# Patient Record
Sex: MALE | Race: WHITE | NOT HISPANIC OR LATINO
[De-identification: names, ages, dates, MRNs, and addresses within clinical notes are randomized per-mention and may not be internally consistent; named-entity substitution may affect disease eponyms.]

---

## 2017-05-16 ENCOUNTER — APPOINTMENT (OUTPATIENT)
Dept: NEUROLOGY | Facility: HOSPITAL | Age: 57
End: 2017-05-16

## 2017-07-18 ENCOUNTER — APPOINTMENT (OUTPATIENT)
Dept: NEUROLOGY | Facility: HOSPITAL | Age: 57
End: 2017-07-18

## 2017-07-18 ENCOUNTER — OUTPATIENT (OUTPATIENT)
Dept: OUTPATIENT SERVICES | Facility: HOSPITAL | Age: 57
LOS: 1 days | End: 2017-07-18

## 2017-07-18 VITALS
HEART RATE: 88 BPM | HEIGHT: 65 IN | SYSTOLIC BLOOD PRESSURE: 133 MMHG | BODY MASS INDEX: 26.34 KG/M2 | WEIGHT: 158.07 LBS | DIASTOLIC BLOOD PRESSURE: 88 MMHG

## 2017-07-18 DIAGNOSIS — Z86.59 PERSONAL HISTORY OF OTHER MENTAL AND BEHAVIORAL DISORDERS: ICD-10-CM

## 2017-07-18 DIAGNOSIS — Z00.00 ENCOUNTER FOR GENERAL ADULT MEDICAL EXAMINATION W/OUT ABNORMAL FINDINGS: ICD-10-CM

## 2017-07-18 DIAGNOSIS — Z86.69 PERSONAL HISTORY OF OTHER DISEASES OF THE NERVOUS SYSTEM AND SENSE ORGANS: ICD-10-CM

## 2017-07-18 DIAGNOSIS — Z81.8 FAMILY HISTORY OF OTHER MENTAL AND BEHAVIORAL DISORDERS: ICD-10-CM

## 2017-08-01 DIAGNOSIS — G80.9 CEREBRAL PALSY, UNSPECIFIED: ICD-10-CM

## 2017-10-24 ENCOUNTER — OUTPATIENT (OUTPATIENT)
Dept: OUTPATIENT SERVICES | Facility: HOSPITAL | Age: 57
LOS: 1 days | End: 2017-10-24

## 2017-10-24 ENCOUNTER — APPOINTMENT (OUTPATIENT)
Dept: NEUROLOGY | Facility: HOSPITAL | Age: 57
End: 2017-10-24

## 2017-10-24 VITALS
WEIGHT: 157 LBS | HEIGHT: 65 IN | SYSTOLIC BLOOD PRESSURE: 127 MMHG | BODY MASS INDEX: 26.16 KG/M2 | DIASTOLIC BLOOD PRESSURE: 84 MMHG | HEART RATE: 81 BPM

## 2017-10-24 DIAGNOSIS — R56.9 UNSPECIFIED CONVULSIONS: ICD-10-CM

## 2017-10-24 DIAGNOSIS — G80.9 CEREBRAL PALSY, UNSPECIFIED: ICD-10-CM

## 2017-10-24 RX ORDER — DIVALPROEX SODIUM 500 MG/1
500 TABLET, DELAYED RELEASE ORAL
Qty: 60 | Refills: 2 | Status: ACTIVE | COMMUNITY
Start: 2017-10-24 | End: 1900-01-01

## 2017-11-13 ENCOUNTER — APPOINTMENT (OUTPATIENT)
Dept: CT IMAGING | Facility: IMAGING CENTER | Age: 57
End: 2017-11-13

## 2017-11-22 ENCOUNTER — APPOINTMENT (OUTPATIENT)
Dept: CT IMAGING | Facility: IMAGING CENTER | Age: 57
End: 2017-11-22
Payer: MEDICARE

## 2017-11-22 ENCOUNTER — OUTPATIENT (OUTPATIENT)
Dept: OUTPATIENT SERVICES | Facility: HOSPITAL | Age: 57
LOS: 1 days | End: 2017-11-22
Payer: MEDICARE

## 2017-11-22 DIAGNOSIS — Z00.00 ENCOUNTER FOR GENERAL ADULT MEDICAL EXAMINATION WITHOUT ABNORMAL FINDINGS: ICD-10-CM

## 2017-11-22 PROCEDURE — 70450 CT HEAD/BRAIN W/O DYE: CPT

## 2017-11-22 PROCEDURE — 70450 CT HEAD/BRAIN W/O DYE: CPT | Mod: 26

## 2017-12-19 ENCOUNTER — OUTPATIENT (OUTPATIENT)
Dept: OUTPATIENT SERVICES | Facility: HOSPITAL | Age: 57
LOS: 1 days | End: 2017-12-19

## 2017-12-19 ENCOUNTER — APPOINTMENT (OUTPATIENT)
Dept: NEUROLOGY | Facility: HOSPITAL | Age: 57
End: 2017-12-19

## 2017-12-19 VITALS
DIASTOLIC BLOOD PRESSURE: 75 MMHG | BODY MASS INDEX: 26.16 KG/M2 | HEIGHT: 65 IN | HEART RATE: 82 BPM | SYSTOLIC BLOOD PRESSURE: 125 MMHG | WEIGHT: 157 LBS

## 2017-12-19 DIAGNOSIS — G80.9 CEREBRAL PALSY, UNSPECIFIED: ICD-10-CM

## 2018-06-14 ENCOUNTER — LABORATORY RESULT (OUTPATIENT)
Age: 58
End: 2018-06-14

## 2018-06-14 ENCOUNTER — APPOINTMENT (OUTPATIENT)
Dept: GASTROENTEROLOGY | Facility: HOSPITAL | Age: 58
End: 2018-06-14

## 2018-06-14 ENCOUNTER — OUTPATIENT (OUTPATIENT)
Dept: OUTPATIENT SERVICES | Facility: HOSPITAL | Age: 58
LOS: 1 days | End: 2018-06-14

## 2018-06-14 VITALS
SYSTOLIC BLOOD PRESSURE: 110 MMHG | HEIGHT: 65 IN | HEART RATE: 96 BPM | DIASTOLIC BLOOD PRESSURE: 74 MMHG | WEIGHT: 152 LBS | BODY MASS INDEX: 25.33 KG/M2

## 2018-06-14 DIAGNOSIS — Z12.11 ENCOUNTER FOR SCREENING FOR MALIGNANT NEOPLASM OF COLON: ICD-10-CM

## 2018-06-14 DIAGNOSIS — K57.90 DIVERTICULOSIS OF INTESTINE, PART UNSPECIFIED, WITHOUT PERFORATION OR ABSCESS WITHOUT BLEEDING: ICD-10-CM

## 2018-06-14 DIAGNOSIS — Z78.9 OTHER SPECIFIED HEALTH STATUS: ICD-10-CM

## 2018-06-14 LAB
ALBUMIN SERPL ELPH-MCNC: 4.2 G/DL — SIGNIFICANT CHANGE UP (ref 3.3–5)
ALP SERPL-CCNC: 70 U/L — SIGNIFICANT CHANGE UP (ref 40–120)
ALT FLD-CCNC: 13 U/L — SIGNIFICANT CHANGE UP (ref 4–41)
AST SERPL-CCNC: 18 U/L — SIGNIFICANT CHANGE UP (ref 4–40)
BASOPHILS # BLD AUTO: 0.02 K/UL — SIGNIFICANT CHANGE UP (ref 0–0.2)
BASOPHILS NFR BLD AUTO: 0.4 % — SIGNIFICANT CHANGE UP (ref 0–2)
BILIRUB SERPL-MCNC: 0.4 MG/DL — SIGNIFICANT CHANGE UP (ref 0.2–1.2)
BUN SERPL-MCNC: 12 MG/DL — SIGNIFICANT CHANGE UP (ref 7–23)
CALCIUM SERPL-MCNC: 9.3 MG/DL — SIGNIFICANT CHANGE UP (ref 8.4–10.5)
CHLORIDE SERPL-SCNC: 101 MMOL/L — SIGNIFICANT CHANGE UP (ref 98–107)
CO2 SERPL-SCNC: 30 MMOL/L — SIGNIFICANT CHANGE UP (ref 22–31)
CREAT SERPL-MCNC: 0.77 MG/DL — SIGNIFICANT CHANGE UP (ref 0.5–1.3)
EOSINOPHIL # BLD AUTO: 0.02 K/UL — SIGNIFICANT CHANGE UP (ref 0–0.5)
EOSINOPHIL NFR BLD AUTO: 0.4 % — SIGNIFICANT CHANGE UP (ref 0–6)
GLUCOSE SERPL-MCNC: 71 MG/DL — SIGNIFICANT CHANGE UP (ref 70–99)
HCT VFR BLD CALC: 43.3 % — SIGNIFICANT CHANGE UP (ref 39–50)
HGB BLD-MCNC: 15.1 G/DL — SIGNIFICANT CHANGE UP (ref 13–17)
IMM GRANULOCYTES # BLD AUTO: 0.01 # — SIGNIFICANT CHANGE UP
IMM GRANULOCYTES NFR BLD AUTO: 0.2 % — SIGNIFICANT CHANGE UP (ref 0–1.5)
LYMPHOCYTES # BLD AUTO: 2.37 K/UL — SIGNIFICANT CHANGE UP (ref 1–3.3)
LYMPHOCYTES # BLD AUTO: 47.2 % — HIGH (ref 13–44)
MCHC RBC-ENTMCNC: 31.9 PG — SIGNIFICANT CHANGE UP (ref 27–34)
MCHC RBC-ENTMCNC: 34.9 % — SIGNIFICANT CHANGE UP (ref 32–36)
MCV RBC AUTO: 91.5 FL — SIGNIFICANT CHANGE UP (ref 80–100)
MONOCYTES # BLD AUTO: 0.46 K/UL — SIGNIFICANT CHANGE UP (ref 0–0.9)
MONOCYTES NFR BLD AUTO: 9.2 % — SIGNIFICANT CHANGE UP (ref 2–14)
NEUTROPHILS # BLD AUTO: 2.14 K/UL — SIGNIFICANT CHANGE UP (ref 1.8–7.4)
NEUTROPHILS NFR BLD AUTO: 42.6 % — LOW (ref 43–77)
NRBC # FLD: 0 — SIGNIFICANT CHANGE UP
PLATELET # BLD AUTO: 158 K/UL — SIGNIFICANT CHANGE UP (ref 150–400)
PMV BLD: 10 FL — SIGNIFICANT CHANGE UP (ref 7–13)
POTASSIUM SERPL-MCNC: 4.1 MMOL/L — SIGNIFICANT CHANGE UP (ref 3.5–5.3)
POTASSIUM SERPL-SCNC: 4.1 MMOL/L — SIGNIFICANT CHANGE UP (ref 3.5–5.3)
PROT SERPL-MCNC: 7.3 G/DL — SIGNIFICANT CHANGE UP (ref 6–8.3)
RBC # BLD: 4.73 M/UL — SIGNIFICANT CHANGE UP (ref 4.2–5.8)
RBC # FLD: 12.4 % — SIGNIFICANT CHANGE UP (ref 10.3–14.5)
SODIUM SERPL-SCNC: 142 MMOL/L — SIGNIFICANT CHANGE UP (ref 135–145)
WBC # BLD: 5.02 K/UL — SIGNIFICANT CHANGE UP (ref 3.8–10.5)
WBC # FLD AUTO: 5.02 K/UL — SIGNIFICANT CHANGE UP (ref 3.8–10.5)

## 2018-06-14 RX ORDER — CHROMIUM 200 MCG
1000 TABLET ORAL DAILY
Qty: 30 | Refills: 0 | Status: ACTIVE | COMMUNITY
Start: 2018-06-14

## 2018-06-14 RX ORDER — DOCUSATE SODIUM 100 MG/1
100 CAPSULE ORAL 3 TIMES DAILY
Qty: 90 | Refills: 5 | Status: ACTIVE | COMMUNITY
Start: 2018-06-14

## 2018-07-19 ENCOUNTER — TRANSCRIPTION ENCOUNTER (OUTPATIENT)
Age: 58
End: 2018-07-19

## 2018-08-09 ENCOUNTER — OTHER (OUTPATIENT)
Age: 58
End: 2018-08-09

## 2018-10-18 ENCOUNTER — APPOINTMENT (OUTPATIENT)
Dept: GASTROENTEROLOGY | Facility: HOSPITAL | Age: 58
End: 2018-10-18
Payer: MEDICARE

## 2018-10-18 ENCOUNTER — OUTPATIENT (OUTPATIENT)
Dept: OUTPATIENT SERVICES | Facility: HOSPITAL | Age: 58
LOS: 1 days | End: 2018-10-18

## 2018-10-18 VITALS
SYSTOLIC BLOOD PRESSURE: 117 MMHG | BODY MASS INDEX: 19.25 KG/M2 | HEIGHT: 74 IN | DIASTOLIC BLOOD PRESSURE: 83 MMHG | WEIGHT: 150 LBS | HEART RATE: 78 BPM

## 2018-10-18 PROCEDURE — 99213 OFFICE O/P EST LOW 20 MIN: CPT | Mod: GC

## 2018-10-19 DIAGNOSIS — Z12.11 ENCOUNTER FOR SCREENING FOR MALIGNANT NEOPLASM OF COLON: ICD-10-CM

## 2018-10-19 DIAGNOSIS — K57.90 DIVERTICULOSIS OF INTESTINE, PART UNSPECIFIED, WITHOUT PERFORATION OR ABSCESS WITHOUT BLEEDING: ICD-10-CM

## 2019-01-24 ENCOUNTER — APPOINTMENT (OUTPATIENT)
Dept: GASTROENTEROLOGY | Facility: HOSPITAL | Age: 59
End: 2019-01-24

## 2019-01-31 ENCOUNTER — APPOINTMENT (OUTPATIENT)
Dept: GASTROENTEROLOGY | Facility: HOSPITAL | Age: 59
End: 2019-01-31

## 2019-02-28 ENCOUNTER — OUTPATIENT (OUTPATIENT)
Dept: OUTPATIENT SERVICES | Facility: HOSPITAL | Age: 59
LOS: 1 days | End: 2019-02-28

## 2019-02-28 ENCOUNTER — APPOINTMENT (OUTPATIENT)
Dept: GASTROENTEROLOGY | Facility: HOSPITAL | Age: 59
End: 2019-02-28
Payer: MEDICARE

## 2019-02-28 VITALS
DIASTOLIC BLOOD PRESSURE: 90 MMHG | SYSTOLIC BLOOD PRESSURE: 135 MMHG | BODY MASS INDEX: 19.18 KG/M2 | WEIGHT: 149.44 LBS | HEART RATE: 104 BPM | HEIGHT: 74 IN

## 2019-02-28 PROCEDURE — 99213 OFFICE O/P EST LOW 20 MIN: CPT | Mod: GC

## 2019-02-28 RX ORDER — POLYETHYLENE GLYCOL 3350, SODIUM SULFATE ANHYDROUS, SODIUM BICARBONATE, SODIUM CHLORIDE, POTASSIUM CHLORIDE 227.1; 21.5; 6.36; 5.53; .754 G/L; G/L; G/L; G/L; G/L
227.1 POWDER, FOR SOLUTION ORAL
Qty: 1 | Refills: 0 | Status: DISCONTINUED | COMMUNITY
Start: 2018-06-14 | End: 2019-02-28

## 2019-02-28 NOTE — HISTORY OF PRESENT ILLNESS
[de-identified] : This is a 58 year old man with cerebral palsy and seizure disorder who presents to GI clinic for follow up regarding colorectal cancer screening.\par \par He was last seen in GI clinic on 10/18/18. At that time he was ordered for Cologuard test.  Although the caregiver stated that the Cologuard test was sent out we called [facility] and they could not find any request or results.  The description by the aide of the test performed was likely a FIT or FOBT test of which we do not have the result.\par \par In the past, he had a colonoscopy (2011) with no obvious polyps but poor prep. He was noted to have internal hemorrhoids and diverticulosis. He was recommended to have repeat colonoscopy in 2016, but did not get it. \par \par Today, patient is accompanied by a caretaker. He reports feeling very well. He denies abdominal pain, nausea, vomiting, melena, hematochezia, hematemesis, weight loss. He moves his bowels regularly. He has no additional complaints.

## 2019-02-28 NOTE — ASSESSMENT
[FreeTextEntry1] : Impression:\par 1. Colorectal cancer screening, at average risk \par 2. History of diverticulosis on previous colonoscopy\par 3. Mental retardation\par \par Recommend:\par - Ordered Cologuard, top half of the form was filled out with Dr. Morel and then it was given to direct patient support personnel who accompanied the patient; explained that the form will need to be filled out and faxed directly to [facility]\par - High fiber diet given history of diverticulosis\par - Recommend to return to clinic in 2 months to discuss results; recommended to call clinic prior to appointment to see if results are available

## 2019-02-28 NOTE — PHYSICAL EXAM
[General Appearance - Alert] : alert [General Appearance - In No Acute Distress] : in no acute distress [Sclera] : the sclera and conjunctiva were normal [Outer Ear] : the ears and nose were normal in appearance [Hearing Threshold Finger Rub Not [place]] : hearing was normal [Neck Appearance] : the appearance of the neck was normal [] : no respiratory distress [Respiration, Rhythm And Depth] : normal respiratory rhythm and effort [Heart Rate And Rhythm] : heart rate was normal and rhythm regular [Arterial Pulses Carotid] : carotid pulses were normal with no bruits [Abdominal Aorta] : the abdominal aorta was normal [Bowel Sounds] : normal bowel sounds [Abdomen Soft] : soft [Abdomen Tenderness] : non-tender [Cervical Lymph Nodes Enlarged Posterior Bilaterally] : posterior cervical [Cervical Lymph Nodes Enlarged Anterior Bilaterally] : anterior cervical [No CVA Tenderness] : no ~M costovertebral angle tenderness [Involuntary Movements] : no involuntary movements were seen [Skin Color & Pigmentation] : normal skin color and pigmentation [Affect] : the affect was normal [Mood] : the mood was normal

## 2019-02-28 NOTE — REASON FOR VISIT
[Follow-Up: _____] : a [unfilled] follow-up visit [Formal Caregiver] : formal caregiver [FreeTextEntry1] : colorectal cancer screening

## 2019-03-01 DIAGNOSIS — Z12.11 ENCOUNTER FOR SCREENING FOR MALIGNANT NEOPLASM OF COLON: ICD-10-CM

## 2019-03-01 DIAGNOSIS — K57.90 DIVERTICULOSIS OF INTESTINE, PART UNSPECIFIED, WITHOUT PERFORATION OR ABSCESS WITHOUT BLEEDING: ICD-10-CM

## 2019-06-29 ENCOUNTER — TRANSCRIPTION ENCOUNTER (OUTPATIENT)
Age: 59
End: 2019-06-29

## 2019-08-29 ENCOUNTER — APPOINTMENT (OUTPATIENT)
Dept: GASTROENTEROLOGY | Facility: CLINIC | Age: 59
End: 2019-08-29
Payer: MEDICARE

## 2019-08-29 ENCOUNTER — OUTPATIENT (OUTPATIENT)
Dept: OUTPATIENT SERVICES | Facility: HOSPITAL | Age: 59
LOS: 1 days | End: 2019-08-29

## 2019-08-29 ENCOUNTER — OTHER (OUTPATIENT)
Age: 59
End: 2019-08-29

## 2019-08-29 VITALS
SYSTOLIC BLOOD PRESSURE: 140 MMHG | WEIGHT: 152 LBS | HEIGHT: 62 IN | OXYGEN SATURATION: 98 % | HEART RATE: 83 BPM | DIASTOLIC BLOOD PRESSURE: 80 MMHG | BODY MASS INDEX: 27.97 KG/M2

## 2019-08-29 DIAGNOSIS — K57.90 DIVERTICULOSIS OF INTESTINE, PART UNSPECIFIED, W/OUT PERFORATION OR ABSCESS W/OUT BLEEDING: ICD-10-CM

## 2019-08-29 DIAGNOSIS — K57.90 DIVERTICULOSIS OF INTESTINE, PART UNSPECIFIED, WITHOUT PERFORATION OR ABSCESS WITHOUT BLEEDING: ICD-10-CM

## 2019-08-29 DIAGNOSIS — K92.1 MELENA: ICD-10-CM

## 2019-08-29 PROCEDURE — 99213 OFFICE O/P EST LOW 20 MIN: CPT | Mod: GC

## 2019-08-29 NOTE — END OF VISIT
[] : Fellow [FreeTextEntry3] : As modified and discussed with patient\par MD INOCENCIO Gregg [facility]\par Associate Professor of Medicine\par [facility] [employer]\par

## 2019-08-29 NOTE — PHYSICAL EXAM
[General Appearance - Well Nourished] : well nourished [General Appearance - Well Developed] : well developed [Sclera] : the sclera and conjunctiva were normal [Extraocular Movements] : extraocular movements were intact [Outer Ear] : the ears and nose were normal in appearance [Hearing Threshold Finger Rub Not [place]] : hearing was normal [Neck Appearance] : the appearance of the neck was normal [Neck Cervical Mass (___cm)] : no neck mass was observed [Respiration, Rhythm And Depth] : normal respiratory rhythm and effort [Auscultation Breath Sounds / Voice Sounds] : lungs were clear to auscultation bilaterally [Heart Sounds] : normal S1 and S2 [Murmurs] : no murmurs [Heart Sounds Pericardial Friction Rub] : no pericardial rub [Bowel Sounds] : normal bowel sounds [Abdomen Soft] : soft [Abdomen Tenderness] : non-tender [Cervical Lymph Nodes Enlarged Posterior Bilaterally] : posterior cervical [Cervical Lymph Nodes Enlarged Anterior Bilaterally] : anterior cervical [Nail Clubbing] : no clubbing  or cyanosis of the fingernails [Motor Tone] : muscle strength and tone were normal [] : no rash [Skin Lesions] : no skin lesions [Sensation] : the sensory exam was normal to light touch and pinprick [Motor Exam] : the motor exam was normal [Affect] : the affect was normal [Mood] : the mood was normal

## 2019-08-29 NOTE — HISTORY OF PRESENT ILLNESS
[de-identified] : Mr. NEIDA ALCARAZ 59 year man, is seen for evaluation of hematochezia. Pt was last seen in the clinic on 02/2019.\par \par Pt reportedly had 1 episode of hematochezia back in 07/2019. This occurred only once. Pt presented to the ED and was found to have a hgb of 15.4. The patient denies nausea, vomiting, a change in bowel habit, dysphagia, weight loss, jaundice, melena or abdominal pain. He has daily bowel movements.  A COLOGUARD test was negative this year.\par \par There is no known family history of colon cancer, colon polyp, peptic ulcer disease, female genitourinary disease, liver disease, cholelithiasis, pancreatic disease or cancer, inflammatory bowel disease or celiac disease.\par \par Pt's last colonoscopy was in 2011 with no obvious polyps but poor prep. Patient had a negative cologuard in 03/2019.

## 2019-08-29 NOTE — ASSESSMENT
[FreeTextEntry1] : Impression:\par 1) Hematochezia- \par Differential diagnosis includes colorectal cancer, colonic polyp, diverticular bleeding, angiectasia, anal fissure/hemorrhoids  \par \par \par Recommendation:\par -Will plan for colonoscopy and prep with magnesium citrtate\par Risks and benefits of the procedure, including: perforation (tear in the colon), bleeding, reaction to sedation, and possible risk for surgical resection and colostomy bag explained\par \par D/w Dr Morel. \par \par

## 2019-09-19 ENCOUNTER — MESSAGE (OUTPATIENT)
Age: 59
End: 2019-09-19

## 2019-11-15 ENCOUNTER — CHART COPY (OUTPATIENT)
Age: 59
End: 2019-11-15

## 2019-11-20 ENCOUNTER — OUTPATIENT (OUTPATIENT)
Dept: OUTPATIENT SERVICES | Facility: HOSPITAL | Age: 59
LOS: 1 days | Discharge: HOME | End: 2019-11-20
Payer: MEDICARE

## 2019-11-20 ENCOUNTER — RESULT REVIEW (OUTPATIENT)
Age: 59
End: 2019-11-20

## 2019-11-20 VITALS
WEIGHT: 151.9 LBS | DIASTOLIC BLOOD PRESSURE: 87 MMHG | RESPIRATION RATE: 12 BRPM | OXYGEN SATURATION: 98 % | TEMPERATURE: 98 F | HEART RATE: 85 BPM | HEIGHT: 62 IN | SYSTOLIC BLOOD PRESSURE: 138 MMHG

## 2019-11-20 VITALS
HEART RATE: 84 BPM | RESPIRATION RATE: 11 BRPM | OXYGEN SATURATION: 96 % | DIASTOLIC BLOOD PRESSURE: 91 MMHG | SYSTOLIC BLOOD PRESSURE: 126 MMHG

## 2019-11-20 DIAGNOSIS — K92.1 MELENA: ICD-10-CM

## 2019-11-20 PROCEDURE — 45380 COLONOSCOPY AND BIOPSY: CPT | Mod: GC

## 2019-11-20 PROCEDURE — 88305 TISSUE EXAM BY PATHOLOGIST: CPT | Mod: 26

## 2019-11-20 RX ORDER — SODIUM CHLORIDE 9 MG/ML
1000 INJECTION, SOLUTION INTRAVENOUS
Refills: 0 | Status: DISCONTINUED | OUTPATIENT
Start: 2019-11-20 | End: 2019-12-17

## 2019-11-20 NOTE — ASU PATIENT PROFILE, ADULT - NS PRO PASSIVE SMOKE EXP
4/3/2018       [address]        Dear Alana Olmos,    Dr Emma Puente has retired. You need to establish care with a ne primary care doctor. You can call our office to schedule with one of the physicians taking new patients, or you can establish at the clinic of your choice. You are due for a follow up so please call soon. So your new doctor can manage your refills.     Sincerely,          [place] [address] [address]  [phone] Unknown

## 2019-11-21 ENCOUNTER — EMERGENCY (EMERGENCY)
Facility: HOSPITAL | Age: 59
LOS: 1 days | Discharge: HOME | End: 2019-11-21
Admitting: EMERGENCY MEDICINE
Payer: COMMERCIAL

## 2019-11-21 VITALS
TEMPERATURE: 98 F | RESPIRATION RATE: 18 BRPM | SYSTOLIC BLOOD PRESSURE: 129 MMHG | HEART RATE: 91 BPM | DIASTOLIC BLOOD PRESSURE: 72 MMHG

## 2019-11-21 PROCEDURE — 99282 EMERGENCY DEPT VISIT SF MDM: CPT
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
## 2019-11-21 NOTE — ED PROVIDER NOTE - NS ED MD EM SELECTION
90025 Exp Problem Focused - Low Complex 61829 Exp Problem Focused - Low Complex 37868 Exp Problem Focused - Low Complex 46071 Exp Problem Focused - Low Complex

## 2019-11-21 NOTE — ED PROVIDER NOTE - PATIENT PORTAL LINK FT
You can access the FollowMyHealth Patient Portal offered by [facility] by registering at the following website: [email or web address]. By joining [employer]’s FollowMyHealth portal, you will also be able to view your health information using other applications (apps) compatible with our system. You can access the FollowMyHealth Patient Portal offered by [facility] by registering at the following website: [email or web address]. By joining [employer]’s FollowMyHealth portal, you will also be able to view your health information using other applications (apps) compatible with our system. You can access the FollowMyHealth Patient Portal offered by [facility] by registering at the following website: [email or web address]. By joining [employer]’s FollowMyHealth portal, you will also be able to view your health information using other applications (apps) compatible with our system. You can access the FollowMyHealth Patient Portal offered by [facility] by registering at the following website: [email or web address]. By joining [employer]’s FollowMyHealth portal, you will also be able to view your health information using other applications (apps) compatible with our system.

## 2019-11-21 NOTE — ED PROVIDER NOTE - PHYSICAL EXAMINATION
Vital signs reviewed.   CONSTITUTIONAL: Well-appearing;  in no apparent distress. Non-toxic appearing.   HEAD: Normocephalic, atraumatic.  EYES: PERRL, EOM intact, conjunctiva and sclera WNL.  ENT: normal nose; no rhinorrhea;  NECK/LYMPH: Supple; non-tender  CARD: Normal S1, S2; no murmurs, rubs, or gallops noted.  RESP: Normal chest excursion with respiration; breath sounds clear and equal bilaterally; no wheezes, rhonchi, or rales.  ABD/GI: soft, non-distended; non-tender;  EXT/MS: moves all extremities;  SKIN: Normal for age and race;   NEURO: Awake, alert, oriented x 3, no gross deficits  PSYCH: Normal mood; appropriate affect.  '
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
## 2019-12-14 ENCOUNTER — EMERGENCY (EMERGENCY)
Facility: HOSPITAL | Age: 59
LOS: 1 days | Discharge: HOME | End: 2019-12-14
Attending: EMERGENCY MEDICINE | Admitting: EMERGENCY MEDICINE
Payer: COMMERCIAL

## 2019-12-14 VITALS
SYSTOLIC BLOOD PRESSURE: 117 MMHG | TEMPERATURE: 99 F | DIASTOLIC BLOOD PRESSURE: 77 MMHG | RESPIRATION RATE: 18 BRPM | OXYGEN SATURATION: 99 % | HEART RATE: 69 BPM

## 2019-12-14 PROCEDURE — 99282 EMERGENCY DEPT VISIT SF MDM: CPT

## 2019-12-14 PROCEDURE — 99053 MED SERV 10PM-8AM 24 HR FAC: CPT
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
## 2019-12-15 NOTE — ED PROVIDER NOTE - PATIENT PORTAL LINK FT
You can access the FollowMyHealth Patient Portal offered by [facility] by registering at the following website: [email or web address]. By joining [employer]’s FollowMyHealth portal, you will also be able to view your health information using other applications (apps) compatible with our system. You can access the FollowMyHealth Patient Portal offered by [facility] by registering at the following website: [email or web address]. By joining [employer]’s FollowMyHealth portal, you will also be able to view your health information using other applications (apps) compatible with our system. You can access the FollowMyHealth Patient Portal offered by [facility] by registering at the following website: [email or web address]. By joining [employer]’s FollowMyHealth portal, you will also be able to view your health information using other applications (apps) compatible with our system. You can access the FollowMyHealth Patient Portal offered by [facility] by registering at the following website: [email or web address]. By joining [employer]’s FollowMyHealth portal, you will also be able to view your health information using other applications (apps) compatible with our system.
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
## 2019-12-15 NOTE — ED PROVIDER NOTE - NS ED MD EM SELECTION
31787 Exp Problem Focused - Mod. Complex 71461 Exp Problem Focused - Mod. Complex 82047 Exp Problem Focused - Mod. Complex 70272 Exp Problem Focused - Mod. Complex

## 2020-01-16 ENCOUNTER — OUTPATIENT (OUTPATIENT)
Dept: OUTPATIENT SERVICES | Facility: HOSPITAL | Age: 60
LOS: 1 days | End: 2020-01-16

## 2020-01-16 ENCOUNTER — APPOINTMENT (OUTPATIENT)
Dept: GASTROENTEROLOGY | Facility: CLINIC | Age: 60
End: 2020-01-16
Payer: MEDICARE

## 2020-01-16 VITALS
BODY MASS INDEX: 28.41 KG/M2 | HEIGHT: 62 IN | WEIGHT: 154.38 LBS | HEART RATE: 72 BPM | OXYGEN SATURATION: 99 % | DIASTOLIC BLOOD PRESSURE: 62 MMHG | SYSTOLIC BLOOD PRESSURE: 108 MMHG

## 2020-01-16 DIAGNOSIS — K59.09 OTHER CONSTIPATION: ICD-10-CM

## 2020-01-16 DIAGNOSIS — D12.6 BENIGN NEOPLASM OF COLON, UNSPECIFIED: ICD-10-CM

## 2020-01-16 DIAGNOSIS — K92.1 MELENA: ICD-10-CM

## 2020-01-16 PROCEDURE — 99213 OFFICE O/P EST LOW 20 MIN: CPT | Mod: GE

## 2020-01-16 NOTE — PHYSICAL EXAM
[General Appearance - Alert] : alert [General Appearance - In No Acute Distress] : in no acute distress [Abdomen Soft] : soft [Bowel Sounds] : normal bowel sounds [Abdomen Tenderness] : non-tender [Abdomen Mass (___ Cm)] : no abdominal mass palpated [Abnormal Walk] : normal gait [Nail Clubbing] : no clubbing  or cyanosis of the fingernails [Musculoskeletal - Swelling] : no joint swelling seen [Oriented To Time, Place, And Person] : oriented to person, place, and time [Motor Tone] : muscle strength and tone were normal [Impaired Insight] : insight and judgment were intact [Affect] : the affect was normal [Sclera] : the sclera and conjunctiva were normal [Outer Ear] : the ears and nose were normal in appearance [Neck Appearance] : the appearance of the neck was normal [] : no respiratory distress [Exaggerated Use Of Accessory Muscles For Inspiration] : no accessory muscle use [Heart Rate And Rhythm] : heart rate was normal and rhythm regular [Edema] : there was no peripheral edema [Skin Color & Pigmentation] : normal skin color and pigmentation [No Focal Deficits] : no focal deficits

## 2020-01-16 NOTE — HISTORY OF PRESENT ILLNESS
[de-identified] : Mr. NEIDA ALCARAZ 59 year man, is seen for follow up after colonoscopy for evaluation of hematochezia. Pt was last seen in the clinic on 02/2019.\par \par Pt reportedly had 1 episode of hematochezia back in 07/2019. This occurred only once. Pt presented to the ED and was found to have a hgb of 15.4. The patient denies nausea, vomiting, a change in bowel habit, dysphagia, weight loss, jaundice, melena or abdominal pain. He has daily bowel movements. A COLOGUARD test was negative this year.\par \par There is no known family history of colon cancer, colon polyp, peptic ulcer disease, female genitourinary disease, liver disease, cholelithiasis, pancreatic disease or cancer, inflammatory bowel disease or celiac disease.\par \par Pt's last colonoscopy before the last visit was in 2011 with no obvious polyps but poor prep. Patient had a negative cologuard in 03/2019. \par \par Patient saw Dr. Ness in August 2019 and was referred for colonoscopy which showed  - One 4 mm polyp in the cecum, removed with a cold  biopsy forceps. Resected and retrieved Internal hemorrhoids. The examined portion of the ileum was normal.  Biopsy was a Tubular adenoma, next colonoscopy recommended in 5 years.  \par \par Denies any further hematochezia.  Abdominal pain pain. No weight loss but a slight gain.  \par

## 2020-01-16 NOTE — END OF VISIT
[] : Fellow [FreeTextEntry3] : Here for follow up for hematochezia, now s/p colonoscopy with diminutive TA and IH (latter likely source of bleeding). Recommend repeat colonoscopy in 5 years. High fiber diet and hydration for constipation.

## 2020-01-16 NOTE — ASSESSMENT
[FreeTextEntry1] : Impression:\par 1) Tubular adenoma - found on colonoscopy in 2019.  Due for repeat colonoscopy in 5 years.  \par 2) Hematochezia - with colonoscopy only with 1 TA and internal hemorrhoids.  Differential diagnosis includes angiectasia, anal fissure but most likely hemorrhoids \par \par \par Recommendation:\par - routine hemorrhoidal care\par - high fiber diet\par - repeat colonoscopy in 5 years\par - follow up at clinic as needed

## 2022-08-28 ENCOUNTER — EMERGENCY (EMERGENCY)
Facility: HOSPITAL | Age: 62
LOS: 1 days | Discharge: HOME | End: 2022-08-28
Admitting: EMERGENCY MEDICINE

## 2022-08-28 VITALS
OXYGEN SATURATION: 95 % | HEART RATE: 96 BPM | RESPIRATION RATE: 16 BRPM | DIASTOLIC BLOOD PRESSURE: 91 MMHG | TEMPERATURE: 98 F | SYSTOLIC BLOOD PRESSURE: 156 MMHG

## 2022-08-28 PROCEDURE — 99283 EMERGENCY DEPT VISIT LOW MDM: CPT

## 2022-08-28 RX ORDER — OFLOXACIN 0.3 %
2 DROPS OPHTHALMIC (EYE) ONCE
Refills: 0 | Status: COMPLETED | OUTPATIENT
Start: 2022-08-28 | End: 2022-08-28

## 2022-08-28 RX ADMIN — Medication 2 DROP(S): at 22:25

## 2022-08-28 NOTE — ED PROVIDER NOTE - PATIENT PORTAL LINK FT
You can access the FollowMyHealth Patient Portal offered by [facility] by registering at the following website: [email or web address]. By joining [employer]’s FollowMyHealth portal, you will also be able to view your health information using other applications (apps) compatible with our system. You can access the FollowMyHealth Patient Portal offered by [facility] by registering at the following website: [email or web address]. By joining [employer]’s FollowMyHealth portal, you will also be able to view your health information using other applications (apps) compatible with our system. You can access the FollowMyHealth Patient Portal offered by [facility] by registering at the following website: [email or web address]. By joining [employer]’s FollowMyHealth portal, you will also be able to view your health information using other applications (apps) compatible with our system. You can access the FollowMyHealth Patient Portal offered by [facility] by registering at the following website: [email or web address]. By joining [employer]’s FollowMyHealth portal, you will also be able to view your health information using other applications (apps) compatible with our system.

## 2022-08-28 NOTE — ED PROVIDER NOTE - PHYSICAL EXAMINATION
Eyes: Pediatric eye chart used, able to identify shapes on 20/40 line bilaterally. Sclera is not injected, normal anterior chamber bilaterally, no hyphema or hypopyon. Red reflex intact bilaterally. EOMI. + R conjunctival erythema with exudate, no pre-septal swelling. Fluorescin stain with woods lamp eval of R eye appears normal though limited by patient movement/non-compliance. Tactile pressure of the R eye is normal.

## 2022-08-28 NOTE — ED PROVIDER NOTE - OBJECTIVE STATEMENT
61 Y/O M H/O Cerebral Palsy, Developmental delay, Gingivitis presents with 1 day of R eye redness. Staff is present with the pt, denies trauma as does pt, staff states he itches/touches his R eye frequently. Denies any other sx or acute complaints. 63 Y/O M H/O Cerebral Palsy, Developmental delay, Gingivitis presents with 1 day of R eye redness. Staff is present with the pt, denies trauma as does pt, staff states he itches/touches his R eye frequently. Denies any other sx or acute complaints.

## 2022-08-28 NOTE — ED ADULT NURSE REASSESSMENT NOTE - SYMPTOMS
pts rubbing rt eye, eye reddened and small amt drainage noted in corner of rt eye. evaluated by provider, medicated as ordered

## 2022-08-28 NOTE — ED PROVIDER NOTE - CLINICAL SUMMARY MEDICAL DECISION MAKING FREE TEXT BOX
63 Y/O M H/O Cerebral Palsy, Developmental delay, Gingivitis presents with 1 day of R eye redness. Staff is present with the pt, denies trauma as does pt, staff states he itches/touches his R eye frequently. Dx Acute bacterial conjunctivitis. Fluorescin stain neg for corneal abrasion, will D/C with Ophthalmology. 61 Y/O M H/O Cerebral Palsy, Developmental delay, Gingivitis presents with 1 day of R eye redness. Staff is present with the pt, denies trauma as does pt, staff states he itches/touches his R eye frequently. Dx Acute bacterial conjunctivitis. Fluorescin stain neg for corneal abrasion, will D/C with Ophthalmology.

## 2022-08-28 NOTE — ED PROVIDER NOTE - NSFOLLOWUPINSTRUCTIONS_ED_ALL_ED_FT
The patient may resume program as soon as the eye is no longer red and no longer has discharge. The patient should follow up with his regular eye doctor. If he does not have follow up he should see The [facility] eye clinic at [address] [place] [address]. The phone is . Advance activity as tolerated.  Continue all previously prescribed medications as directed.  Follow up with your primary care physician in 48-72 hours- bring copies of your results.  Return to the ER for worsening or persistent symptoms, and/or ANY NEW OR CONCERNING SYMPTOMS. THIS INCLUDES LOSS OR CHANGE OF VISION, PAIN, INCREASING REDNESS OR SWELLING OR FOR ANY OTHER SYMPTOMS THAT CONCERN YOU. If you have issues obtaining follow up, please call: [phone] to obtain a doctor or specialist who takes your insurance in your area.  You may call [phone] to make an appointment with the internal medicine clinic. The patient may resume program as soon as the eye is no longer red and no longer has discharge. The patient should follow up with his regular eye doctor. If he does not have follow up he should see The [facility] eye clinic at [address] [place] [address]. The phone is . Advance activity as tolerated.  Continue all previously prescribed medications as directed.  Follow up with your primary care physician in 48-72 hours- bring copies of your results.  Return to the ER for worsening or persistent symptoms, and/or ANY NEW OR CONCERNING SYMPTOMS. THIS INCLUDES LOSS OR CHANGE OF VISION, PAIN, INCREASING REDNESS OR SWELLING OR FOR ANY OTHER SYMPTOMS THAT CONCERN YOU. If you have issues obtaining follow up, please call: [phone] to obtain a doctor or specialist who takes your insurance in your area.  You may call [phone] to make an appointment with the internal medicine clinic. The patient may resume program as soon as the eye is no longer red and no longer has discharge. The patient should follow up with his regular eye doctor. If he does not have follow up he should see The [facility] eye clinic at [address] [place] [address]. The phone is . Advance activity as tolerated.  Continue all previously prescribed medications as directed.  Follow up with your primary care physician in 48-72 hours- bring copies of your results.  Return to the ER for worsening or persistent symptoms, and/or ANY NEW OR CONCERNING SYMPTOMS. THIS INCLUDES LOSS OR CHANGE OF VISION, PAIN, INCREASING REDNESS OR SWELLING OR FOR ANY OTHER SYMPTOMS THAT CONCERN YOU. If you have issues obtaining follow up, please call: [phone] to obtain a doctor or specialist who takes your insurance in your area.  You may call [phone] to make an appointment with the internal medicine clinic. The patient may resume program as soon as the eye is no longer red and no longer has discharge. The patient should follow up with his regular eye doctor. If he does not have follow up he should see The [facility] eye clinic at [address] [place] [address]. The phone is . Advance activity as tolerated.  Continue all previously prescribed medications as directed.  Follow up with your primary care physician in 48-72 hours- bring copies of your results.  Return to the ER for worsening or persistent symptoms, and/or ANY NEW OR CONCERNING SYMPTOMS. THIS INCLUDES LOSS OR CHANGE OF VISION, PAIN, INCREASING REDNESS OR SWELLING OR FOR ANY OTHER SYMPTOMS THAT CONCERN YOU. If you have issues obtaining follow up, please call: [phone] to obtain a doctor or specialist who takes your insurance in your area.  You may call [phone] to make an appointment with the internal medicine clinic. USE THE DROPS GIVEN TO YOU IN THE ER, THE DOSE IS 2 DROPS 4 TIMES PER DAY IN THE RIGHT EYE FOR 5 DAYS. THE BOTTLE YOU WERE GIVEN HAS ENOUGH MEDICATION. The patient may resume program as soon as the eye is no longer red and no longer has discharge. The patient should follow up with his regular eye doctor. If he does not have follow up he should see The [facility] eye clinic at [address] [facility] [address]. The phone is . Advance activity as tolerated.  Continue all previously prescribed medications as directed.  Follow up with your primary care physician in 48-72 hours- bring copies of your results.  Return to the ER for worsening or persistent symptoms, and/or ANY NEW OR CONCERNING SYMPTOMS. THIS INCLUDES LOSS OR CHANGE OF VISION, PAIN, INCREASING REDNESS OR SWELLING OR FOR ANY OTHER SYMPTOMS THAT CONCERN YOU. If you have issues obtaining follow up, please call: [phone] to obtain a doctor or specialist who takes your insurance in your area.  You may call [phone] to make an appointment with the internal medicine clinic. USE THE DROPS GIVEN TO YOU IN THE ER, THE DOSE IS 2 DROPS 4 TIMES PER DAY IN THE RIGHT EYE FOR 5 DAYS. THE BOTTLE YOU WERE GIVEN HAS ENOUGH MEDICATION. The patient may resume program as soon as the eye is no longer red and no longer has discharge. The patient should follow up with his regular eye doctor. If he does not have follow up he should see The [facility] eye clinic at [address] [facility] [address]. The phone is . Advance activity as tolerated.  Continue all previously prescribed medications as directed.  Follow up with your primary care physician in 48-72 hours- bring copies of your results.  Return to the ER for worsening or persistent symptoms, and/or ANY NEW OR CONCERNING SYMPTOMS. THIS INCLUDES LOSS OR CHANGE OF VISION, PAIN, INCREASING REDNESS OR SWELLING OR FOR ANY OTHER SYMPTOMS THAT CONCERN YOU. If you have issues obtaining follow up, please call: [phone] to obtain a doctor or specialist who takes your insurance in your area.  You may call [phone] to make an appointment with the internal medicine clinic. USE THE DROPS GIVEN TO YOU IN THE ER, THE DOSE IS 2 DROPS 4 TIMES PER DAY IN THE RIGHT EYE FOR 5 DAYS. THE BOTTLE YOU WERE GIVEN HAS ENOUGH MEDICATION. The patient may resume program as soon as the eye is no longer red and no longer has discharge. The patient should follow up with his regular eye doctor. If he does not have follow up he should see The [facility] eye clinic at [address] [facility] [address]. The phone is . Advance activity as tolerated.  Continue all previously prescribed medications as directed.  Follow up with your primary care physician in 48-72 hours- bring copies of your results.  Return to the ER for worsening or persistent symptoms, and/or ANY NEW OR CONCERNING SYMPTOMS. THIS INCLUDES LOSS OR CHANGE OF VISION, PAIN, INCREASING REDNESS OR SWELLING OR FOR ANY OTHER SYMPTOMS THAT CONCERN YOU. If you have issues obtaining follow up, please call: [phone] to obtain a doctor or specialist who takes your insurance in your area.  You may call [phone] to make an appointment with the internal medicine clinic. USE THE DROPS GIVEN TO YOU IN THE ER, THE DOSE IS 2 DROPS 4 TIMES PER DAY IN THE RIGHT EYE FOR 5 DAYS. THE BOTTLE YOU WERE GIVEN HAS ENOUGH MEDICATION. The patient may resume program as soon as the eye is no longer red and no longer has discharge. The patient should follow up with his regular eye doctor. If he does not have follow up he should see The [facility] eye clinic at [address] [facility] [address]. The phone is . Advance activity as tolerated.  Continue all previously prescribed medications as directed.  Follow up with your primary care physician in 48-72 hours- bring copies of your results.  Return to the ER for worsening or persistent symptoms, and/or ANY NEW OR CONCERNING SYMPTOMS. THIS INCLUDES LOSS OR CHANGE OF VISION, PAIN, INCREASING REDNESS OR SWELLING OR FOR ANY OTHER SYMPTOMS THAT CONCERN YOU. If you have issues obtaining follow up, please call: [phone] to obtain a doctor or specialist who takes your insurance in your area.  You may call [phone] to make an appointment with the internal medicine clinic. USE THE DROPS GIVEN TO YOU IN THE ER, THE DOSE IS 2 DROPS 4 TIMES PER DAY IN THE RIGHT EYE FOR 5 DAYS. THE BOTTLE YOU WERE GIVEN HAS ENOUGH MEDICATION TO COMPLETE THE COURSE. The patient may resume program as soon as the eye is no longer red and no longer has discharge. The patient should follow up with his regular eye doctor. If he does not have follow up he should see The [facility] eye clinic at [address] [place] [address]. The phone is . Advance activity as tolerated.  Continue all previously prescribed medications as directed.  Follow up with your primary care physician in 48-72 hours- bring copies of your results.  Return to the ER for worsening or persistent symptoms, and/or ANY NEW OR CONCERNING SYMPTOMS. THIS INCLUDES LOSS OR CHANGE OF VISION, PAIN, INCREASING REDNESS OR SWELLING OR FOR ANY OTHER SYMPTOMS THAT CONCERN YOU. If you have issues obtaining follow up, please call: [phone] to obtain a doctor or specialist who takes your insurance in your area.  You may call [phone] to make an appointment with the internal medicine clinic. USE THE DROPS GIVEN TO YOU IN THE ER, THE DOSE IS 2 DROPS 4 TIMES PER DAY IN THE RIGHT EYE FOR 5 DAYS. THE BOTTLE YOU WERE GIVEN HAS ENOUGH MEDICATION TO COMPLETE THE COURSE. The patient may resume program as soon as the eye is no longer red and no longer has discharge. The patient should follow up with his regular eye doctor. If he does not have follow up he should see The [facility] eye clinic at [address] [place] [address]. The phone is . Advance activity as tolerated.  Continue all previously prescribed medications as directed.  Follow up with your primary care physician in 48-72 hours- bring copies of your results.  Return to the ER for worsening or persistent symptoms, and/or ANY NEW OR CONCERNING SYMPTOMS. THIS INCLUDES LOSS OR CHANGE OF VISION, PAIN, INCREASING REDNESS OR SWELLING OR FOR ANY OTHER SYMPTOMS THAT CONCERN YOU. If you have issues obtaining follow up, please call: [phone] to obtain a doctor or specialist who takes your insurance in your area.  You may call [phone] to make an appointment with the internal medicine clinic. USE THE DROPS GIVEN TO YOU IN THE ER, THE DOSE IS 2 DROPS 4 TIMES PER DAY IN THE RIGHT EYE FOR 5 DAYS. THE BOTTLE YOU WERE GIVEN HAS ENOUGH MEDICATION TO COMPLETE THE COURSE. The patient may resume program as soon as the eye is no longer red and no longer has discharge. The patient should follow up with his regular eye doctor. If he does not have follow up he should see The [facility] eye clinic at [address] [place] [address]. The phone is . Advance activity as tolerated.  Continue all previously prescribed medications as directed.  Follow up with your primary care physician in 48-72 hours- bring copies of your results.  Return to the ER for worsening or persistent symptoms, and/or ANY NEW OR CONCERNING SYMPTOMS. THIS INCLUDES LOSS OR CHANGE OF VISION, PAIN, INCREASING REDNESS OR SWELLING OR FOR ANY OTHER SYMPTOMS THAT CONCERN YOU. If you have issues obtaining follow up, please call: [phone] to obtain a doctor or specialist who takes your insurance in your area.  You may call [phone] to make an appointment with the internal medicine clinic. USE THE DROPS GIVEN TO YOU IN THE ER, THE DOSE IS 2 DROPS 4 TIMES PER DAY IN THE RIGHT EYE FOR 5 DAYS. THE BOTTLE YOU WERE GIVEN HAS ENOUGH MEDICATION TO COMPLETE THE COURSE. The patient may resume program as soon as the eye is no longer red and no longer has discharge. The patient should follow up with his regular eye doctor. If he does not have follow up he should see The [facility] eye clinic at [address] [place] [address]. The phone is . Advance activity as tolerated.  Continue all previously prescribed medications as directed.  Follow up with your primary care physician in 48-72 hours- bring copies of your results.  Return to the ER for worsening or persistent symptoms, and/or ANY NEW OR CONCERNING SYMPTOMS. THIS INCLUDES LOSS OR CHANGE OF VISION, PAIN, INCREASING REDNESS OR SWELLING OR FOR ANY OTHER SYMPTOMS THAT CONCERN YOU. If you have issues obtaining follow up, please call: [phone] to obtain a doctor or specialist who takes your insurance in your area.  You may call [phone] to make an appointment with the internal medicine clinic.

## 2022-08-28 NOTE — ED PROVIDER NOTE - CARE PLAN
1 Patient with history of chronic hypotension.   -c/w midodrine 10mg PO q8h Principal Discharge DX:	Acute conjunctivitis, right eye

## 2022-08-28 NOTE — ED ADULT TRIAGE NOTE - CHIEF COMPLAINT QUOTE
Pt from group home accompanied by staff member for eye redness/irritation. Denies blurry vision, fevers. States developed itchiness, redness to eye around 5pm. PMHx Cerebal Palsy [id number] Detailed

## 2022-08-28 NOTE — ED ADULT NURSE REASSESSMENT NOTE - COMFORT CARE
aide, discharge instructions given to aide to bring back to facility as well as medication/plan of care explained

## 2023-02-17 ENCOUNTER — EMERGENCY (EMERGENCY)
Facility: HOSPITAL | Age: 63
LOS: 1 days | Discharge: HOME | End: 2023-02-17
Attending: EMERGENCY MEDICINE | Admitting: EMERGENCY MEDICINE
Payer: MEDICARE

## 2023-02-17 VITALS
HEART RATE: 94 BPM | RESPIRATION RATE: 16 BRPM | OXYGEN SATURATION: 100 % | SYSTOLIC BLOOD PRESSURE: 142 MMHG | DIASTOLIC BLOOD PRESSURE: 92 MMHG | TEMPERATURE: 97 F

## 2023-02-17 PROCEDURE — 99284 EMERGENCY DEPT VISIT MOD MDM: CPT

## 2023-02-17 NOTE — ED ADULT TRIAGE NOTE - CHIEF COMPLAINT QUOTE
right foot swollen    [facility] escorted by dsp lonnie brannon (does not wish to give cell number).  sent by facility for swelling and discoloration to right foot, 2nd and 3rd toes.  wears brace to right lower ext. denies pain.  pmhx-mild intellectual disability, CP, quadriparesis, constipation, vit deficiency, impulse control

## 2023-02-18 PROCEDURE — 73630 X-RAY EXAM OF FOOT: CPT | Mod: 26,RT

## 2023-02-18 NOTE — ED PROVIDER NOTE - CLINICAL SUMMARY MEDICAL DECISION MAKING FREE TEXT BOX
Andreas Lacy, PGY-3- 62 year old male with hx of CP, intellectual delay, s/p bumping foot 2/17 morning presenting with ecchymosis. Pt with ecchymosis to dorsum of 2/3/4th toes, nontender on exam, pt with reduced sensation at baseline, 2+ dp pulse, no other trauma or deformity noted. Plan for XR. Likely contusion. Less likely fx/strain. Pt has been ambulatory since. Plan to DC home following XR.

## 2023-02-18 NOTE — ED PROVIDER NOTE - PATIENT PORTAL LINK FT
You can access the FollowMyHealth Patient Portal offered by [facility] by registering at the following website: [email or web address]. By joining [employer]’s FollowMyHealth portal, you will also be able to view your health information using other applications (apps) compatible with our system. You can access the FollowMyHealth Patient Portal offered by [facility] by registering at the following website: [email or web address]. By joining [employer]’s FollowMyHealth portal, you will also be able to view your health information using other applications (apps) compatible with our system. You can access the FollowMyHealth Patient Portal offered by [facility] by registering at the following website: [email or web address]. By joining [employer]’s FollowMyHealth portal, you will also be able to view your health information using other applications (apps) compatible with our system. You can access the FollowMyHealth Patient Portal offered by [facility] by registering at the following website: [email or web address]. By joining [employer]’s FollowMyHealth portal, you will also be able to view your health information using other applications (apps) compatible with our system.

## 2023-02-18 NOTE — ED ADULT NURSE NOTE - INTERVENTIONS DEFINITIONS
[place] to call system/Call bell, personal items and telephone within reach/Instruct patient to call for assistance/Non-slip footwear when patient is off stretcher/Physically safe environment: no spills, clutter or unnecessary equipment/Stretcher in lowest position, wheels locked, appropriate side rails in place/Monitor gait and stability/Monitor for mental status changes and reorient to person, place, and time [place] to call system/Call bell, personal items and telephone within reach/Instruct patient to call for assistance/Non-slip footwear when patient is off stretcher/Physically safe environment: no spills, clutter or unnecessary equipment/Stretcher in lowest position, wheels locked, appropriate side rails in place/Monitor gait and stability/Monitor for mental status changes and reorient to person, place, and time [place] to call system/Call bell, personal items and telephone within reach/Instruct patient to call for assistance/Non-slip footwear when patient is off stretcher/Physically safe environment: no spills, clutter or unnecessary equipment/Stretcher in lowest position, wheels locked, appropriate side rails in place/Monitor gait and stability/Monitor for mental status changes and reorient to person, place, and time [place] to call system/Call bell, personal items and telephone within reach/Instruct patient to call for assistance/Non-slip footwear when patient is off stretcher/Physically safe environment: no spills, clutter or unnecessary equipment/Stretcher in lowest position, wheels locked, appropriate side rails in place/Monitor gait and stability/Monitor for mental status changes and reorient to person, place, and time

## 2023-02-18 NOTE — ED ADULT NURSE NOTE - OBJECTIVE STATEMENT
pt received to spot 3a from group home accompanied by staff. pt speaks with stutter c/o "discoloration" to right 2nd and 3rd toes. does not recall any injury. able to ambulate with walker as he does at baseline. denies pain.

## 2023-02-18 NOTE — ED PROVIDER NOTE - WR INTERPRETATION 1
MSK XR negative - No fracture, No dislocation, No foreign body  Chronic appearing deformities consistent with CP, no acute fracture

## 2023-02-18 NOTE — ED ADULT NURSE NOTE - HOW OFTEN DO YOU HAVE A DRINK CONTAINING ALCOHOL?
I called pt, had to leave a voicemail
GHISLAINE Waterman.
Never

## 2023-02-18 NOTE — ED PROVIDER NOTE - ATTENDING CONTRIBUTION TO CARE
Gong: I have seen and examined the patient face to face, have reviewed and addended the HPI, PE and a/p as necessary.     61 yo M with CP, intellectual delay a/w ecchymosis to R foot after minor trauma.  Pt reports bumping his foot earlier today.  Reports R foot is typically in a brace.  Staff noted that R 2 and 3 toe had ecchymosis.  Denies any pain, trauma.      On Exam   GEN - NAD; well appearing; A+O x3; non-toxic appearing  CARD - pulses distally with good cap refil  PULM - breathing comfortably;   EXT - symmetric pulses, 2+ dp, capillary refill < 2 seconds, no cyanosis, no edema;   MSK - moving extremities well, no obvious deformity, ecchymosis to 2nd, 3rd, 4th dorsum of toes, no tenderness over foot/ankle, 5/5 strength in extremity.  NEURO - no focal neuro deficits, no slurred speech    61 yo M with CP, intellectual delay a/w ecchymosis to R foot after minor trauma after bumping foot.    - check xray to r/o occult fracture. Gong: I have seen and examined the patient face to face, have reviewed and addended the HPI, PE and a/p as necessary.     63 yo M with CP, intellectual delay a/w ecchymosis to R foot after minor trauma.  Pt reports bumping his foot earlier today.  Reports R foot is typically in a brace.  Staff noted that R 2 and 3 toe had ecchymosis.  Denies any pain, trauma.      On Exam   GEN - NAD; well appearing; A+O x3; non-toxic appearing  CARD - pulses distally with good cap refil  PULM - breathing comfortably;   EXT - symmetric pulses, 2+ dp, capillary refill < 2 seconds, no cyanosis, no edema;   MSK - moving extremities well, no obvious deformity, ecchymosis to 2nd, 3rd, 4th dorsum of toes, no tenderness over foot/ankle, 5/5 strength in extremity.  NEURO - no focal neuro deficits, no slurred speech    63 yo M with CP, intellectual delay a/w ecchymosis to R foot after minor trauma after bumping foot.    - check xray to r/o occult fracture.

## 2023-02-18 NOTE — ED PROVIDER NOTE - OBJECTIVE STATEMENT
Andreas Bambi, PGY-3- 62 year old male with a pmhx of CP, intellectual delay, is brought to ED from group home for evaluation of ecchymosis to R foot. Per pt, he bumped his foot while walking earlier today. Noted to have ecchymosis by staff. Not in pain. No other trauma.

## 2023-02-18 NOTE — ED PROVIDER NOTE - PROGRESS NOTE DETAILS
Andreas Lacy, PGY-3- XR consistent with chronic appearing deformity, no acute fx evaluated. Discussed results of work up with patient. Patient agrees with plan to discharge home. All questions answered regarding plan. Strict return precautions given.

## 2023-02-18 NOTE — ED PROVIDER NOTE - PHYSICAL EXAMINATION
General: well appearing, no acute distress, AOx3  Skin: no rash, no pallor  Head: normocephalic, atraumatic  Eyes: clear conjunctiva, EOMI  ENMT: airway patent, no nasal discharge  Pulmonary: speaking in full sentences, no tachypnea   Abdomen: soft, nontender  Musculoskeletal: moving extremities well, no deformity, ecchymosis to 2nd, 3rd, 4th dorsum of toes, no tenderness over foot/ankle, 5/5 strength in extremity, 2+ DP pulse    Psych: normal mood, normal affect

## 2023-02-18 NOTE — ED PROVIDER NOTE - NSFOLLOWUPINSTRUCTIONS_ED_ALL_ED_FT
Follow up with PCP within 1 week  Please return for severe pain, difficulty walking, additional trauma, difficulty moving ankle/toes  Please keep foot elevated, use ice as needed for swelling.

## 2023-04-05 NOTE — ED PROVIDER NOTE - IV ALTEPLASE ADMIN OUTSIDE HIDDEN
Samples Given: Epiceram\\n\\nFree and clear detergent Continue Regimen: Hydrocortisone 2.5% cream bid x 3 weeks (Pt has Rx at home) Render In Strict Bullet Format?: No Detail Level: Detailed show

## 2023-08-26 ENCOUNTER — NON-APPOINTMENT (OUTPATIENT)
Age: 63
End: 2023-08-26

## 2023-09-16 ENCOUNTER — EMERGENCY (EMERGENCY)
Facility: HOSPITAL | Age: 63
LOS: 1 days | Discharge: HOME | End: 2023-09-16
Attending: EMERGENCY MEDICINE
Payer: MEDICARE

## 2023-09-16 VITALS
RESPIRATION RATE: 17 BRPM | SYSTOLIC BLOOD PRESSURE: 133 MMHG | DIASTOLIC BLOOD PRESSURE: 79 MMHG | TEMPERATURE: 98 F | HEART RATE: 84 BPM | OXYGEN SATURATION: 98 %

## 2023-09-16 VITALS
RESPIRATION RATE: 16 BRPM | WEIGHT: 160.06 LBS | OXYGEN SATURATION: 97 % | HEIGHT: 68 IN | DIASTOLIC BLOOD PRESSURE: 92 MMHG | TEMPERATURE: 99 F | SYSTOLIC BLOOD PRESSURE: 150 MMHG | HEART RATE: 117 BPM

## 2023-09-16 PROCEDURE — 10060 I&D ABSCESS SIMPLE/SINGLE: CPT

## 2023-09-16 PROCEDURE — 99283 EMERGENCY DEPT VISIT LOW MDM: CPT | Mod: 25

## 2023-09-16 PROCEDURE — 87070 CULTURE OTHR SPECIMN AEROBIC: CPT

## 2023-09-16 PROCEDURE — 99284 EMERGENCY DEPT VISIT MOD MDM: CPT | Mod: FS,25

## 2023-09-16 PROCEDURE — 87186 SC STD MICRODIL/AGAR DIL: CPT

## 2023-09-16 PROCEDURE — 87077 CULTURE AEROBIC IDENTIFY: CPT

## 2023-09-16 PROCEDURE — 10061 I&D ABSCESS COMP/MULTIPLE: CPT

## 2023-09-16 PROCEDURE — 87205 SMEAR GRAM STAIN: CPT

## 2023-09-16 RX ADMIN — Medication 100 MILLIGRAM(S): at 16:36

## 2023-09-16 NOTE — ED PROVIDER NOTE - CARE PLAN
1 Principal Discharge DX:	Abscess   Principal Discharge DX:	Abscess  Goal:	Anterior chest wall infected sebaceous cyst

## 2023-09-16 NOTE — ED PROVIDER NOTE - NSFOLLOWUPINSTRUCTIONS_ED_ALL_ED_FT
1- Doxycycline 100 mg every 12 hours for the next 10 days  2- Tylenol for pain  3- Keep bandage and packing in place.  If you shower try not to let packing fall out and cover with a new bandage  4- Return to ER or follow up with your primary care physician in 2 days for a wound check  5- Return to ER for any new or worsening complaints

## 2023-09-16 NOTE — ED PROVIDER NOTE - CLINICAL SUMMARY MEDICAL DECISION MAKING FREE TEXT BOX
Adult male w chest wall abscess. I&D completed w sebacous/purulent drainage w/o difficulty. DC for opt followup  Brian Callahan MD, Facep

## 2023-09-16 NOTE — ED PROVIDER NOTE - ATTENDING CONTRIBUTION TO CARE
Private Physician   63y m pmh Cerebral palsy Resident of [facility], Diverticulosis, Seizure, PT comes to ed c/o "I have a cyst"  Pt had cyst discovered on morning shift from [facility]. No fever,chills. nvdc, shortness of breath. Private Physician   63y m pmh Cerebral palsy Resident of [facility], Diverticulosis, Seizure, PT comes to ed c/o "I have a cyst"  Pt had cyst discovered on morning shift from [facility]. No fever,chills. nvdc, shortness of breath. Pt went to  and referred to ED. PE [facility] male awake alert obvious MR, NAD normocephalic atraumatic neck supple chest + raised red swelling to central chest. ?infected sebacous cyst. Neuro awake alert moves all ext  Brian Callahan MD, Facep

## 2023-09-16 NOTE — ED PROVIDER NOTE - OBJECTIVE STATEMENT
Private Physician   63y m pmh Cerebral palsy Resident of [facility], Diverticulosis, Seizure, PT comes to ed c/o "I have a cyst"  Pt had cyst discovered on morning shift from [facility]. No fever, chills. nvdc, shortness of breath. Pt went to UC and referred to ED.

## 2023-09-16 NOTE — ED PROCEDURE NOTE - NS ED ATTENDING STATEMENT MOD
This was a shared visit with the DONNY. I reviewed and verified the documentation and independently performed the documented:

## 2023-09-16 NOTE — ED ADULT NURSE NOTE - OBJECTIVE STATEMENT
64y/o m w/ pmh Cerebral palsy, Diverticulosis, Seizure came w/ complaints of abcess on medial  chest. Pt states "I have a cyst on my chest", pt states that it was found this morning. Pt denies fever, chills, shortness of breath. Pt went to  and referred to ED.

## 2023-09-16 NOTE — ED PROVIDER NOTE - PATIENT PORTAL LINK FT
You can access the FollowMyHealth Patient Portal offered by [facility] by registering at the following website: [email or web address]. By joining [employer]’s FollowMyHealth portal, you will also be able to view your health information using other applications (apps) compatible with our system.

## 2023-09-17 ENCOUNTER — EMERGENCY (EMERGENCY)
Facility: HOSPITAL | Age: 63
LOS: 1 days | Discharge: HOME | End: 2023-09-17
Attending: EMERGENCY MEDICINE
Payer: MEDICARE

## 2023-09-17 VITALS
OXYGEN SATURATION: 96 % | HEART RATE: 100 BPM | DIASTOLIC BLOOD PRESSURE: 89 MMHG | SYSTOLIC BLOOD PRESSURE: 161 MMHG | TEMPERATURE: 99 F | RESPIRATION RATE: 18 BRPM | HEIGHT: 68 IN | WEIGHT: 210.1 LBS

## 2023-09-17 LAB
GRAM STN FLD: SIGNIFICANT CHANGE UP
SPECIMEN SOURCE: SIGNIFICANT CHANGE UP

## 2023-09-17 PROCEDURE — 99283 EMERGENCY DEPT VISIT LOW MDM: CPT | Mod: GC

## 2023-09-17 PROCEDURE — G0463: CPT

## 2023-09-17 RX ORDER — CIPROFLOXACIN LACTATE 400MG/40ML
1 VIAL (ML) INTRAVENOUS
Qty: 10 | Refills: 0
Start: 2023-09-17 | End: 2023-09-21

## 2023-09-17 NOTE — ED ADULT NURSE NOTE - NSFALLRISKINTERV_ED_ALL_ED

## 2023-09-17 NOTE — ED PROVIDER NOTE - PATIENT PORTAL LINK FT
You can access the FollowMyHealth Patient Portal offered by [facility] by registering at the following website: [email or web address]. By joining [employer]’s FollowMyHealth portal, you will also be able to view your health information using other applications (apps) compatible with our system.

## 2023-09-17 NOTE — ED PROVIDER NOTE - OBJECTIVE STATEMENT
63-year-old male patient past medical history cerebral palsy, group home patient, diverticulosis, seizures comes in for wound check status post abscess drainage and packing to anterior chest wall from yesterday.  Patient was prescribed Doxy 100 twice daily x10 days.  Wound culture showed gram-negative organisms.  Denies fevers, chills, body aches, increased pain, increased redness, nausea, vomiting, chest pain, shortness of breath.

## 2023-09-17 NOTE — ED PROVIDER NOTE - NSFOLLOWUPINSTRUCTIONS_ED_ALL_ED_FT
Skin Abscess  Close-up of an abscess on the skin.  A skin abscess is an infected area on or under your skin that contains a collection of pus and other material. An abscess may also be called a furuncle, carbuncle, or boil. An abscess can occur in or on almost any part of your body.    Some abscesses break open (rupture) on their own. Most continue to get worse unless they are treated. The infection can spread deeper into the body and eventually into your blood, which can make you feel ill. Treatment usually involves draining the abscess.    What are the causes?  An abscess occurs when germs, like bacteria, pass through your skin and cause an infection. This may be caused by:  A scrape or cut on your skin.  A puncture wound through your skin, including a needle injection or insect bite.  Blocked oil or sweat glands.  Blocked and infected hair follicles.  A cyst that forms beneath your skin (sebaceous cyst) and becomes infected.  What increases the risk?  This condition is more likely to develop in people who:  Have a weak body defense system (immune system).  Have diabetes.  Have dry and irritated skin.  Get frequent injections or use illegal IV drugs.  Have a foreign body in a wound, such as a splinter.  Have problems with their lymph system or veins.  What are the signs or symptoms?  Symptoms of this condition include:  A painful, firm bump under the skin.  A bump with pus at the top. This may break through the skin and drain.  Other symptoms include:  Redness surrounding the abscess site.  Warmth.  Swelling of the lymph nodes (glands) near the abscess.  Tenderness.  A sore on the skin.  How is this diagnosed?  This condition may be diagnosed based on:  A physical exam.  Your medical history.  A sample of pus. This may be used to find out what is causing the infection.  Blood tests.  Imaging tests, such as an ultrasound, CT scan, or MRI.  How is this treated?  A small abscess that drains on its own may not need treatment. Treatment for larger abscesses may include:  Moist heat or heat pack applied to the area several times a day.  A procedure to drain the abscess (incision and drainage).  Antibiotic medicines. For a severe abscess, you may first get antibiotics through an IV and then change to antibiotics by mouth.  Follow these instructions at home:  Medicines    A prescription pill bottle with an example of a pill.  Take over-the-counter and prescription medicines only as told by your health care provider.  If you were prescribed an antibiotic medicine, take it as told by your health care provider. Do not stop taking the antibiotic even if you start to feel better.  Abscess care    Washing hands with soap and water.  If you have an abscess that has not drained, apply heat to the affected area. Use the heat source that your health care provider recommends, such as a moist heat pack or a heating pad.  Place a towel between your skin and the heat source.  Leave the heat on for 20–30 minutes.  Remove the heat if your skin turns bright red. This is especially important if you are unable to feel pain, heat, or cold. You may have a greater risk of getting burned.  Follow instructions from your health care provider about how to take care of your abscess. Make sure you:  Cover the abscess with a bandage (dressing).  Change your dressing or gauze as told by your health care provider.  Wash your hands with soap and water before you change the dressing or gauze. If soap and water are not available, use hand .  Check your abscess every day for signs of a worsening infection. Check for:  More redness, swelling, or pain.  More fluid or blood.  Warmth.  More pus or a bad smell.  General instructions    To avoid spreading the infection:  Do not share personal care items, towels, or hot tubs with others.  Avoid making skin contact with other people.  Keep all follow-up visits as told by your health care provider. This is important.  Contact a health care provider if you have:  More redness, swelling, or pain around your abscess.  More fluid or blood coming from your abscess.  Warm skin around your abscess.  More pus or a bad smell coming from your abscess.  Muscle aches.  Chills or a general ill feeling.  Get help right away if you:  Have severe pain.  See red streaks on your skin spreading away from the abscess.  See redness that spreads quickly.  Have a fever or chills.  Summary  A skin abscess is an infected area on or under your skin that contains a collection of pus and other material.  A small abscess that drains on its own may not need treatment.  Treatment for larger abscesses may include having a procedure to drain the abscess and taking an antibiotic.  This information is not intended to replace advice given to you by your health care provider. Make sure you discuss any questions you have with your health care provider.

## 2023-09-17 NOTE — ED PROVIDER NOTE - CLINICAL SUMMARY MEDICAL DECISION MAKING FREE TEXT BOX
63-year-old male patient past medical history cerebral palsy, group home patient, diverticulosis, seizures comes in for wound check status post abscess drainage and packing to anterior chest wall from yesterday.  Patient was prescribed Doxy 100 twice daily x10 days.  Wound culture showed gram-negative organisms.  Denies fevers, chills, body aches, increased pain, increased redness, nausea, vomiting, chest pain, shortness of breath.    Patient is nontoxic-appearing in ED, no systemic symptoms, vitals are stable. No signs of redness, induration, fluctuance, streaking. Will DC patient home with added antibiotic Cipro 500 twice daily x5 days for gram-negative organisms found on wound culture done yesterday. Will redress wound with new gauze. 63-year-old male patient past medical history cerebral palsy, group home patient, diverticulosis, seizures comes in for wound check status post abscess drainage and packing to anterior chest wall from yesterday.  Patient was prescribed Doxy 100 twice daily x10 days.  Wound culture showed gram-negative organisms.  Denies fevers, chills, body aches, increased pain, increased redness, nausea, vomiting, chest pain, shortness of breath.    Patient is nontoxic-appearing in ED, no systemic symptoms, vitals are stable. No signs of redness, induration, fluctuance, streaking. Will DC patient home with added antibiotic Cipro 500 twice daily x5 days for gram-negative organisms found on wound culture done yesterday. Will redress wound with new gauze.    Dr. Sullivan (Attending Physician)

## 2023-09-17 NOTE — ED ADULT NURSE NOTE - OBJECTIVE STATEMENT
Pt bib attendant from his group home for eval of serosanguineous drainage from anterior chest wall abscess which was treated in this ED yesterday.  He was placed on oral abx at that time.  Wound margins clean, no fever reported.

## 2023-09-17 NOTE — ED PROVIDER NOTE - PHYSICAL EXAMINATION
GENERAL: NAD  HEENT:  Atraumatic  CHEST/LUNG: Chest rise equal bilaterally  HEART: Regular rate and rhythm  EXTREMITIES:  Extremities warm  PSYCH: A&Ox3  SKIN: Drained abscess noted to anterior chest wall. Packing noted. Bloody drainage.  MSK: No cervical spine TTP, able to range neck to the left and right  NEUROLOGY: strength and sensation intact in all extremities.

## 2023-09-17 NOTE — ED ADULT TRIAGE NOTE - CHIEF COMPLAINT QUOTE
Boil to chest   Wound check Seen yesterday in ED Boil to chest   Wound check Seen yesterday in ED  On antibiotics

## 2023-09-17 NOTE — ED POST DISCHARGE NOTE - DETAILS
9/18: Spoke with Maggie at  states pt is doing much better. Abscess sig improving and pt it still taking his abx. Brandon Munoz PA-C

## 2023-09-18 LAB
-  AMIKACIN: SIGNIFICANT CHANGE UP
-  AMOXICILLIN/CLAVULANIC ACID: SIGNIFICANT CHANGE UP
-  AMPICILLIN/SULBACTAM: SIGNIFICANT CHANGE UP
-  AMPICILLIN: SIGNIFICANT CHANGE UP
-  AZTREONAM: SIGNIFICANT CHANGE UP
-  CEFAZOLIN: SIGNIFICANT CHANGE UP
-  CEFEPIME: SIGNIFICANT CHANGE UP
-  CEFOXITIN: SIGNIFICANT CHANGE UP
-  CEFTRIAXONE: SIGNIFICANT CHANGE UP
-  CIPROFLOXACIN: SIGNIFICANT CHANGE UP
-  ERTAPENEM: SIGNIFICANT CHANGE UP
-  GENTAMICIN: SIGNIFICANT CHANGE UP
-  LEVOFLOXACIN: SIGNIFICANT CHANGE UP
-  MEROPENEM: SIGNIFICANT CHANGE UP
-  PIPERACILLIN/TAZOBACTAM: SIGNIFICANT CHANGE UP
-  TOBRAMYCIN: SIGNIFICANT CHANGE UP
-  TRIMETHOPRIM/SULFAMETHOXAZOLE: SIGNIFICANT CHANGE UP
CULTURE RESULTS: SIGNIFICANT CHANGE UP
METHOD TYPE: SIGNIFICANT CHANGE UP
ORGANISM # SPEC MICROSCOPIC CNT: SIGNIFICANT CHANGE UP
ORGANISM # SPEC MICROSCOPIC CNT: SIGNIFICANT CHANGE UP
SPECIMEN SOURCE: SIGNIFICANT CHANGE UP

## 2023-11-18 ENCOUNTER — EMERGENCY (EMERGENCY)
Facility: HOSPITAL | Age: 63
LOS: 1 days | Discharge: HOME | End: 2023-11-18
Attending: EMERGENCY MEDICINE
Payer: MEDICARE

## 2023-11-18 VITALS
DIASTOLIC BLOOD PRESSURE: 88 MMHG | TEMPERATURE: 99 F | OXYGEN SATURATION: 97 % | RESPIRATION RATE: 20 BRPM | HEIGHT: 63 IN | HEART RATE: 85 BPM | WEIGHT: 179.9 LBS | SYSTOLIC BLOOD PRESSURE: 129 MMHG

## 2023-11-18 PROCEDURE — 99284 EMERGENCY DEPT VISIT MOD MDM: CPT

## 2023-11-18 PROCEDURE — 99283 EMERGENCY DEPT VISIT LOW MDM: CPT

## 2023-11-18 RX ORDER — MUPIROCIN 20 MG/G
1 OINTMENT TOPICAL ONCE
Refills: 0 | Status: COMPLETED | OUTPATIENT
Start: 2023-11-18 | End: 2023-11-18

## 2023-11-18 RX ADMIN — MUPIROCIN 1 APPLICATION(S): 20 OINTMENT TOPICAL at 16:32

## 2023-11-18 NOTE — ED ADULT NURSE NOTE - NS ED NURSE RECORD ANOTHER HT AND WT
Patient is scheduled. She has a few questions about this procedure and would like a call back from the care team.     Rochelle REAL    [facility] [place]      Yes

## 2023-11-18 NOTE — ED PROVIDER NOTE - NSFOLLOWUPINSTRUCTIONS_ED_ALL_ED_FT
See your Primary Doctor this week for follow up -- call to discuss.    Keep wound clean/dry, use BACTROBAN ointment as directed -- see medication warnings.    See SKIN ABRASION information and return instructions given to you.    Seek immediate medical care for new/worsening symptoms/concerns.
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
## 2023-11-18 NOTE — ED PROVIDER NOTE - PHYSICAL EXAMINATION
LUE w/ 2 cm superficial abrasion w/ bruise around, no bleeding/oozing or deep space involvement, no deb tenderness/deformity, nvi w/ bcr distally    aox3, nml speech, nml gait LUE w/ 2 cm superficial abrasion w/ bruise around, no bleeding/oozing or deep space involvement, no deb tenderness/deformity, nvi w/ bcr distally    aox3, nml speech, baseline limited gait  pleasant, nml affect

## 2023-11-18 NOTE — ED PROVIDER NOTE - CLINICAL SUMMARY MEDICAL DECISION MAKING FREE TEXT BOX
------------ATTENDING NOTE------------  pt w/ aide c/o scratch to L upper arm yesterday, came to ED because group home wanted area checked out, exam w/ suprficial abrasion, no FB, no deep space violation/involvement, no infectious changes, no additional injuries/trauma/complaints, pt at his baseline, in depth dw all about ddx, tx, jensen, continued close outpt fu.  - Jovan Frias MD   ---------------------------------------------

## 2023-11-18 NOTE — ED PROVIDER NOTE - PATIENT PORTAL LINK FT
You can access the FollowMyHealth Patient Portal offered by [facility] by registering at the following website: [email or web address]. By joining [employer]’s FollowMyHealth portal, you will also be able to view your health information using other applications (apps) compatible with our system.

## 2023-12-07 ENCOUNTER — EMERGENCY (EMERGENCY)
Facility: HOSPITAL | Age: 63
LOS: 1 days | End: 2023-12-07
Attending: STUDENT IN AN ORGANIZED HEALTH CARE EDUCATION/TRAINING PROGRAM
Payer: MEDICARE

## 2023-12-07 VITALS
TEMPERATURE: 98 F | WEIGHT: 160.06 LBS | OXYGEN SATURATION: 96 % | SYSTOLIC BLOOD PRESSURE: 125 MMHG | RESPIRATION RATE: 20 BRPM | HEIGHT: 63 IN | DIASTOLIC BLOOD PRESSURE: 80 MMHG | HEART RATE: 103 BPM

## 2023-12-07 VITALS
SYSTOLIC BLOOD PRESSURE: 120 MMHG | DIASTOLIC BLOOD PRESSURE: 90 MMHG | HEART RATE: 90 BPM | OXYGEN SATURATION: 98 % | TEMPERATURE: 212 F | RESPIRATION RATE: 18 BRPM

## 2023-12-07 LAB
ALBUMIN SERPL ELPH-MCNC: 3.6 G/DL — SIGNIFICANT CHANGE UP (ref 3.3–5)
ALBUMIN SERPL ELPH-MCNC: 3.6 G/DL — SIGNIFICANT CHANGE UP (ref 3.3–5)
ALP SERPL-CCNC: 66 U/L — SIGNIFICANT CHANGE UP (ref 40–120)
ALP SERPL-CCNC: 66 U/L — SIGNIFICANT CHANGE UP (ref 40–120)
ALT FLD-CCNC: 18 U/L — SIGNIFICANT CHANGE UP (ref 10–45)
ALT FLD-CCNC: 18 U/L — SIGNIFICANT CHANGE UP (ref 10–45)
ANION GAP SERPL CALC-SCNC: 7 MMOL/L — SIGNIFICANT CHANGE UP (ref 5–17)
ANION GAP SERPL CALC-SCNC: 7 MMOL/L — SIGNIFICANT CHANGE UP (ref 5–17)
APPEARANCE UR: CLEAR — SIGNIFICANT CHANGE UP
APPEARANCE UR: CLEAR — SIGNIFICANT CHANGE UP
AST SERPL-CCNC: 27 U/L — SIGNIFICANT CHANGE UP (ref 10–40)
AST SERPL-CCNC: 27 U/L — SIGNIFICANT CHANGE UP (ref 10–40)
BASE EXCESS BLDV CALC-SCNC: 3.2 MMOL/L — HIGH (ref -2–3)
BASE EXCESS BLDV CALC-SCNC: 3.2 MMOL/L — HIGH (ref -2–3)
BASOPHILS # BLD AUTO: 0.02 K/UL — SIGNIFICANT CHANGE UP (ref 0–0.2)
BASOPHILS # BLD AUTO: 0.02 K/UL — SIGNIFICANT CHANGE UP (ref 0–0.2)
BASOPHILS NFR BLD AUTO: 0.4 % — SIGNIFICANT CHANGE UP (ref 0–2)
BASOPHILS NFR BLD AUTO: 0.4 % — SIGNIFICANT CHANGE UP (ref 0–2)
BILIRUB SERPL-MCNC: 0.4 MG/DL — SIGNIFICANT CHANGE UP (ref 0.2–1.2)
BILIRUB SERPL-MCNC: 0.4 MG/DL — SIGNIFICANT CHANGE UP (ref 0.2–1.2)
BILIRUB UR-MCNC: NEGATIVE — SIGNIFICANT CHANGE UP
BILIRUB UR-MCNC: NEGATIVE — SIGNIFICANT CHANGE UP
BUN SERPL-MCNC: 17 MG/DL — SIGNIFICANT CHANGE UP (ref 7–23)
BUN SERPL-MCNC: 17 MG/DL — SIGNIFICANT CHANGE UP (ref 7–23)
CA-I SERPL-SCNC: 1.23 MMOL/L — SIGNIFICANT CHANGE UP (ref 1.15–1.33)
CA-I SERPL-SCNC: 1.23 MMOL/L — SIGNIFICANT CHANGE UP (ref 1.15–1.33)
CALCIUM SERPL-MCNC: 9 MG/DL — SIGNIFICANT CHANGE UP (ref 8.4–10.5)
CALCIUM SERPL-MCNC: 9 MG/DL — SIGNIFICANT CHANGE UP (ref 8.4–10.5)
CHLORIDE BLDV-SCNC: 104 MMOL/L — SIGNIFICANT CHANGE UP (ref 96–108)
CHLORIDE BLDV-SCNC: 104 MMOL/L — SIGNIFICANT CHANGE UP (ref 96–108)
CHLORIDE SERPL-SCNC: 104 MMOL/L — SIGNIFICANT CHANGE UP (ref 96–108)
CHLORIDE SERPL-SCNC: 104 MMOL/L — SIGNIFICANT CHANGE UP (ref 96–108)
CK SERPL-CCNC: 223 U/L — HIGH (ref 30–200)
CK SERPL-CCNC: 223 U/L — HIGH (ref 30–200)
CO2 BLDV-SCNC: 31 MMOL/L — HIGH (ref 22–26)
CO2 BLDV-SCNC: 31 MMOL/L — HIGH (ref 22–26)
CO2 SERPL-SCNC: 25 MMOL/L — SIGNIFICANT CHANGE UP (ref 22–31)
CO2 SERPL-SCNC: 25 MMOL/L — SIGNIFICANT CHANGE UP (ref 22–31)
COLOR SPEC: YELLOW — SIGNIFICANT CHANGE UP
COLOR SPEC: YELLOW — SIGNIFICANT CHANGE UP
CREAT SERPL-MCNC: 0.71 MG/DL — SIGNIFICANT CHANGE UP (ref 0.5–1.3)
CREAT SERPL-MCNC: 0.71 MG/DL — SIGNIFICANT CHANGE UP (ref 0.5–1.3)
DIFF PNL FLD: NEGATIVE — SIGNIFICANT CHANGE UP
DIFF PNL FLD: NEGATIVE — SIGNIFICANT CHANGE UP
EGFR: 102 ML/MIN/1.73M2 — SIGNIFICANT CHANGE UP
EGFR: 102 ML/MIN/1.73M2 — SIGNIFICANT CHANGE UP
EOSINOPHIL # BLD AUTO: 0.03 K/UL — SIGNIFICANT CHANGE UP (ref 0–0.5)
EOSINOPHIL # BLD AUTO: 0.03 K/UL — SIGNIFICANT CHANGE UP (ref 0–0.5)
EOSINOPHIL NFR BLD AUTO: 0.5 % — SIGNIFICANT CHANGE UP (ref 0–6)
EOSINOPHIL NFR BLD AUTO: 0.5 % — SIGNIFICANT CHANGE UP (ref 0–6)
FLUAV AG NPH QL: SIGNIFICANT CHANGE UP
FLUAV AG NPH QL: SIGNIFICANT CHANGE UP
FLUBV AG NPH QL: SIGNIFICANT CHANGE UP
FLUBV AG NPH QL: SIGNIFICANT CHANGE UP
GAS PNL BLDV: 136 MMOL/L — SIGNIFICANT CHANGE UP (ref 136–145)
GAS PNL BLDV: 136 MMOL/L — SIGNIFICANT CHANGE UP (ref 136–145)
GAS PNL BLDV: SIGNIFICANT CHANGE UP
GAS PNL BLDV: SIGNIFICANT CHANGE UP
GLUCOSE BLDV-MCNC: 90 MG/DL — SIGNIFICANT CHANGE UP (ref 70–99)
GLUCOSE BLDV-MCNC: 90 MG/DL — SIGNIFICANT CHANGE UP (ref 70–99)
GLUCOSE SERPL-MCNC: 90 MG/DL — SIGNIFICANT CHANGE UP (ref 70–99)
GLUCOSE SERPL-MCNC: 90 MG/DL — SIGNIFICANT CHANGE UP (ref 70–99)
GLUCOSE UR QL: NEGATIVE MG/DL — SIGNIFICANT CHANGE UP
GLUCOSE UR QL: NEGATIVE MG/DL — SIGNIFICANT CHANGE UP
HCO3 BLDV-SCNC: 29 MMOL/L — SIGNIFICANT CHANGE UP (ref 22–29)
HCO3 BLDV-SCNC: 29 MMOL/L — SIGNIFICANT CHANGE UP (ref 22–29)
HCT VFR BLD CALC: 40.9 % — SIGNIFICANT CHANGE UP (ref 39–50)
HCT VFR BLD CALC: 40.9 % — SIGNIFICANT CHANGE UP (ref 39–50)
HCT VFR BLDA CALC: 43 % — SIGNIFICANT CHANGE UP (ref 39–51)
HCT VFR BLDA CALC: 43 % — SIGNIFICANT CHANGE UP (ref 39–51)
HGB BLD CALC-MCNC: 14.2 G/DL — SIGNIFICANT CHANGE UP (ref 12.6–17.4)
HGB BLD CALC-MCNC: 14.2 G/DL — SIGNIFICANT CHANGE UP (ref 12.6–17.4)
HGB BLD-MCNC: 13.7 G/DL — SIGNIFICANT CHANGE UP (ref 13–17)
HGB BLD-MCNC: 13.7 G/DL — SIGNIFICANT CHANGE UP (ref 13–17)
IMM GRANULOCYTES NFR BLD AUTO: 0.9 % — SIGNIFICANT CHANGE UP (ref 0–0.9)
IMM GRANULOCYTES NFR BLD AUTO: 0.9 % — SIGNIFICANT CHANGE UP (ref 0–0.9)
KETONES UR-MCNC: ABNORMAL MG/DL
KETONES UR-MCNC: ABNORMAL MG/DL
LACTATE BLDV-MCNC: 1.7 MMOL/L — SIGNIFICANT CHANGE UP (ref 0.5–2)
LACTATE BLDV-MCNC: 1.7 MMOL/L — SIGNIFICANT CHANGE UP (ref 0.5–2)
LEUKOCYTE ESTERASE UR-ACNC: NEGATIVE — SIGNIFICANT CHANGE UP
LEUKOCYTE ESTERASE UR-ACNC: NEGATIVE — SIGNIFICANT CHANGE UP
LIDOCAIN IGE QN: 38 U/L — SIGNIFICANT CHANGE UP (ref 7–60)
LIDOCAIN IGE QN: 38 U/L — SIGNIFICANT CHANGE UP (ref 7–60)
LYMPHOCYTES # BLD AUTO: 1.67 K/UL — SIGNIFICANT CHANGE UP (ref 1–3.3)
LYMPHOCYTES # BLD AUTO: 1.67 K/UL — SIGNIFICANT CHANGE UP (ref 1–3.3)
LYMPHOCYTES # BLD AUTO: 30 % — SIGNIFICANT CHANGE UP (ref 13–44)
LYMPHOCYTES # BLD AUTO: 30 % — SIGNIFICANT CHANGE UP (ref 13–44)
MAGNESIUM SERPL-MCNC: 2.1 MG/DL — SIGNIFICANT CHANGE UP (ref 1.6–2.6)
MAGNESIUM SERPL-MCNC: 2.1 MG/DL — SIGNIFICANT CHANGE UP (ref 1.6–2.6)
MCHC RBC-ENTMCNC: 32.2 PG — SIGNIFICANT CHANGE UP (ref 27–34)
MCHC RBC-ENTMCNC: 32.2 PG — SIGNIFICANT CHANGE UP (ref 27–34)
MCHC RBC-ENTMCNC: 33.5 GM/DL — SIGNIFICANT CHANGE UP (ref 32–36)
MCHC RBC-ENTMCNC: 33.5 GM/DL — SIGNIFICANT CHANGE UP (ref 32–36)
MCV RBC AUTO: 96 FL — SIGNIFICANT CHANGE UP (ref 80–100)
MCV RBC AUTO: 96 FL — SIGNIFICANT CHANGE UP (ref 80–100)
MONOCYTES # BLD AUTO: 0.59 K/UL — SIGNIFICANT CHANGE UP (ref 0–0.9)
MONOCYTES # BLD AUTO: 0.59 K/UL — SIGNIFICANT CHANGE UP (ref 0–0.9)
MONOCYTES NFR BLD AUTO: 10.6 % — SIGNIFICANT CHANGE UP (ref 2–14)
MONOCYTES NFR BLD AUTO: 10.6 % — SIGNIFICANT CHANGE UP (ref 2–14)
NEUTROPHILS # BLD AUTO: 3.21 K/UL — SIGNIFICANT CHANGE UP (ref 1.8–7.4)
NEUTROPHILS # BLD AUTO: 3.21 K/UL — SIGNIFICANT CHANGE UP (ref 1.8–7.4)
NEUTROPHILS NFR BLD AUTO: 57.6 % — SIGNIFICANT CHANGE UP (ref 43–77)
NEUTROPHILS NFR BLD AUTO: 57.6 % — SIGNIFICANT CHANGE UP (ref 43–77)
NITRITE UR-MCNC: NEGATIVE — SIGNIFICANT CHANGE UP
NITRITE UR-MCNC: NEGATIVE — SIGNIFICANT CHANGE UP
NRBC # BLD: 0 /100 WBCS — SIGNIFICANT CHANGE UP (ref 0–0)
NRBC # BLD: 0 /100 WBCS — SIGNIFICANT CHANGE UP (ref 0–0)
PCO2 BLDV: 48 MMHG — SIGNIFICANT CHANGE UP (ref 42–55)
PCO2 BLDV: 48 MMHG — SIGNIFICANT CHANGE UP (ref 42–55)
PH BLDV: 7.39 — SIGNIFICANT CHANGE UP (ref 7.32–7.43)
PH BLDV: 7.39 — SIGNIFICANT CHANGE UP (ref 7.32–7.43)
PH UR: 8 — SIGNIFICANT CHANGE UP (ref 5–8)
PH UR: 8 — SIGNIFICANT CHANGE UP (ref 5–8)
PLATELET # BLD AUTO: 161 K/UL — SIGNIFICANT CHANGE UP (ref 150–400)
PLATELET # BLD AUTO: 161 K/UL — SIGNIFICANT CHANGE UP (ref 150–400)
PO2 BLDV: 42 MMHG — SIGNIFICANT CHANGE UP (ref 25–45)
PO2 BLDV: 42 MMHG — SIGNIFICANT CHANGE UP (ref 25–45)
POTASSIUM BLDV-SCNC: 4.1 MMOL/L — SIGNIFICANT CHANGE UP (ref 3.5–5.1)
POTASSIUM BLDV-SCNC: 4.1 MMOL/L — SIGNIFICANT CHANGE UP (ref 3.5–5.1)
POTASSIUM SERPL-MCNC: 4.4 MMOL/L — SIGNIFICANT CHANGE UP (ref 3.5–5.3)
POTASSIUM SERPL-MCNC: 4.4 MMOL/L — SIGNIFICANT CHANGE UP (ref 3.5–5.3)
POTASSIUM SERPL-SCNC: 4.4 MMOL/L — SIGNIFICANT CHANGE UP (ref 3.5–5.3)
POTASSIUM SERPL-SCNC: 4.4 MMOL/L — SIGNIFICANT CHANGE UP (ref 3.5–5.3)
PROT SERPL-MCNC: 6.2 G/DL — SIGNIFICANT CHANGE UP (ref 6–8.3)
PROT SERPL-MCNC: 6.2 G/DL — SIGNIFICANT CHANGE UP (ref 6–8.3)
PROT UR-MCNC: NEGATIVE MG/DL — SIGNIFICANT CHANGE UP
PROT UR-MCNC: NEGATIVE MG/DL — SIGNIFICANT CHANGE UP
RBC # BLD: 4.26 M/UL — SIGNIFICANT CHANGE UP (ref 4.2–5.8)
RBC # BLD: 4.26 M/UL — SIGNIFICANT CHANGE UP (ref 4.2–5.8)
RBC # FLD: 12.6 % — SIGNIFICANT CHANGE UP (ref 10.3–14.5)
RBC # FLD: 12.6 % — SIGNIFICANT CHANGE UP (ref 10.3–14.5)
RSV RNA NPH QL NAA+NON-PROBE: SIGNIFICANT CHANGE UP
RSV RNA NPH QL NAA+NON-PROBE: SIGNIFICANT CHANGE UP
SAO2 % BLDV: 64.9 % — LOW (ref 67–88)
SAO2 % BLDV: 64.9 % — LOW (ref 67–88)
SARS-COV-2 RNA SPEC QL NAA+PROBE: DETECTED
SARS-COV-2 RNA SPEC QL NAA+PROBE: DETECTED
SODIUM SERPL-SCNC: 136 MMOL/L — SIGNIFICANT CHANGE UP (ref 135–145)
SODIUM SERPL-SCNC: 136 MMOL/L — SIGNIFICANT CHANGE UP (ref 135–145)
SP GR SPEC: 1.01 — SIGNIFICANT CHANGE UP (ref 1–1.03)
SP GR SPEC: 1.01 — SIGNIFICANT CHANGE UP (ref 1–1.03)
TROPONIN T, HIGH SENSITIVITY RESULT: 12 NG/L — SIGNIFICANT CHANGE UP (ref 0–51)
TROPONIN T, HIGH SENSITIVITY RESULT: 12 NG/L — SIGNIFICANT CHANGE UP (ref 0–51)
UROBILINOGEN FLD QL: 0.2 MG/DL — SIGNIFICANT CHANGE UP (ref 0.2–1)
UROBILINOGEN FLD QL: 0.2 MG/DL — SIGNIFICANT CHANGE UP (ref 0.2–1)
WBC # BLD: 5.57 K/UL — SIGNIFICANT CHANGE UP (ref 3.8–10.5)
WBC # BLD: 5.57 K/UL — SIGNIFICANT CHANGE UP (ref 3.8–10.5)
WBC # FLD AUTO: 5.57 K/UL — SIGNIFICANT CHANGE UP (ref 3.8–10.5)
WBC # FLD AUTO: 5.57 K/UL — SIGNIFICANT CHANGE UP (ref 3.8–10.5)

## 2023-12-07 PROCEDURE — 71045 X-RAY EXAM CHEST 1 VIEW: CPT | Mod: 26

## 2023-12-07 PROCEDURE — 99285 EMERGENCY DEPT VISIT HI MDM: CPT | Mod: GC

## 2023-12-07 RX ORDER — SODIUM CHLORIDE 9 MG/ML
1000 INJECTION INTRAMUSCULAR; INTRAVENOUS; SUBCUTANEOUS ONCE
Refills: 0 | Status: COMPLETED | OUTPATIENT
Start: 2023-12-07 | End: 2023-12-07

## 2023-12-07 RX ADMIN — SODIUM CHLORIDE 1000 MILLILITER(S): 9 INJECTION INTRAMUSCULAR; INTRAVENOUS; SUBCUTANEOUS at 11:55

## 2023-12-07 NOTE — ED PROVIDER NOTE - PATIENT PORTAL LINK FT
You can access the FollowMyHealth Patient Portal offered by [facility] by registering at the following website: [email or web address]. By joining [employer]’s FollowMyHealth portal, you will also be able to view your health information using other applications (apps) compatible with our system. You can access the FollowMyHealth Patient Portal offered by [facility] by registering at the following website: [email or web address]. By joining [employer]’s FollowMyHealth portal, you will also be able to view your health information using other applications (apps) compatible with our system.

## 2023-12-07 NOTE — ED ADULT TRIAGE NOTE - STATUS:
Applied Dorsal Nasal Flap Text: The defect edges were debeveled with a #15 scalpel blade.  Given the location of the defect and the proximity to free margins a dorsal nasal flap was deemed most appropriate.  Using a sterile surgical marker, an appropriate dorsal nasal flap was drawn around the defect.    The area thus outlined was incised deep to adipose tissue with a #15 scalpel blade.  The skin margins were undermined to an appropriate distance in all directions utilizing iris scissors.

## 2023-12-07 NOTE — ED PROVIDER NOTE - CARE PLAN
Principal Discharge DX:	Need for assistance due to unsteady gait   1 Principal Discharge DX:	2019 novel coronavirus disease (COVID-19)

## 2023-12-07 NOTE — ED ADULT NURSE NOTE - OBJECTIVE STATEMENT
Male 63 years old alert and orientedx1 brought in by EMS from a group home for fall. As per staff pt has unsteady Male 63 years old alert and orientedx1 with past medical history of Cerebral Palsy brought in by EMS from a group home for fall. As per staff pt been feeling weak, unsteady gait since his Abilify medication dose, change from 10 mg to 5 mg. States pt been falling 2-3 times a day for the past 2-3 weeks. No bruise or laceration noted. Male 63 years old alert and orientedx1 with past medical history of Cerebral Palsy brought in by EMS from a group home for fall. As per staff pt been feeling weak, unsteady gait since his Abilify medication dose, change from 10 mg to 5 mg. States pt been falling 2-3 times a day for the past 2-3 weeks. No bruise or laceration noted. No numbness or tingling, denies chest pain or sob. No fever, chills or urinary symptoms.  Safety and comfort maintained. Call bell within easy reach. Will continue to monitor. HHA at bedside

## 2023-12-07 NOTE — ED PROVIDER NOTE - ATTENDING CONTRIBUTION TO CARE
Plastic Surgeon Procedure Text (B): After obtaining clear surgical margins the patient was sent to plastics for surgical repair. I was the supervising attending. I have independently seen face-to-face and examined the patient. I have reviewed the history and physical and discussed the MDM with the resident, fellow, DONNY and/or student. I agree with the assessment and plan as presented unless otherwise documented as follows:    63M hx CP, seizure disorder, presenting with weakness. Patient unable to provide history on initial evaluation. On collateral obtained from group home RN Gonzalez over the phone, patient has had recurrent episodes over last two weeks of dragging his feet/legs giving out under him. Has not had any falls/has always been supported or caught by group home staff. Was started on Abilify in 10/2023 and saw psychiatry yesterday who felt that symptoms might be 2/2 high Abilify dose and recommended decreasing to 5mg. Otherwise denies known fevers, cough, vomiting, diarrhea. Appears well, not in acute distress. No signs of traumatic injury on exam. Will assess for electrolyte abnormalities, new infections with labs/UA, CXR. -Lissa Hyman MD (Attending) I was the supervising attending. I have independently seen face-to-face and examined the patient. I have reviewed the history and physical and discussed the MDM with the resident, fellow, DONNY and/or student. I agree with the assessment and plan as presented unless otherwise documented as follows:    63M hx CP, seizure disorder, presenting with weakness. Patient unable to provide clear/consistent history on initial evaluation. On collateral obtained from group home RN Gonzalez over the phone, patient has had recurrent episodes over last two weeks of dragging his feet & his legs giving out under him. Has not had any falls/has always been supported or caught by group home staff. Was started on Abilify 10mg in 10/2023 and saw psychiatry yesterday who felt that symptoms might be 2/2 high Abilify dose and recommended decreasing to 5mg. Otherwise denies known fevers, cough, vomiting, diarrhea. Appears well, not in acute distress. No signs of traumatic injury on exam. Will assess for electrolyte abnormalities, new infections with labs/UA, CXR. -Lissa Hyman MD (Attending)

## 2023-12-07 NOTE — ED PROVIDER NOTE - NSICDXPASTMEDICALHX_GEN_ALL_CORE_FT
PAST MEDICAL HISTORY:  Diverticulosis     H/O cerebral palsy     MR (mental retardation)     Seizure

## 2023-12-07 NOTE — ED PROVIDER NOTE - CLINICAL SUMMARY MEDICAL DECISION MAKING FREE TEXT BOX
63-year-old male history of cerebral palsy seizure disorder diverticulosis presenting with 8 from group home following 2 weeks of reported weakness and multiple falls.  None of his falls were witnessed.   With nonactionable vital signs.  Primary and secondary trauma exam without outward signs of traumatic injury.  Will obtain chest x-ray pelvis x-ray CT head CBC CMP troponin VBG magnesium phosphate EKG viral swab, likely require admission for inability to ambulate for physical therapy evaluation 63-year-old male history of cerebral palsy seizure disorder diverticulosis presenting with 8 from group home following 2 weeks of reported weakness and near falls.  With nonactionable vital signs.  Primary and secondary trauma exam without outward signs of traumatic injury.  Will obtain chest x-ray CBC CMP troponin VBG magnesium phosphate EKG viral swab to assess for new infection, electrolyte abnormalities.

## 2023-12-07 NOTE — ED PROVIDER NOTE - OBJECTIVE STATEMENT
63-year-old male history of cerebral palsy seizure disorder diverticulosis presenting with 8 from group home following 2 weeks of reported weakness and multiple falls.  None of his falls were witnessed.  Per the patient he "sometimes falls on purpose" aide states that he  walks with a walker at baseline, recently has been refusing to walk.  Aide states that he recently had his Abilify dosage reduced from 10 mg to 5 mg.  Patient currently denying any pain.  Denying any head strike during any of these falls.  Denies loss of consciousness fever chills nausea vomiting chest pain shortness of breath. 63-year-old male history of cerebral palsy seizure disorder diverticulosis presenting with 8 from group home following 2 weeks of reported weakness and near falls.  Per the patient he "sometimes falls on purpose." Aide states that he walks with a walker at baseline, recently has been refusing to walk.  Aide states that he recently had his Abilify dosage reduced from 10 mg to 5 mg.  Patient currently denying any pain.  Denying any head strike during any of these falls.  Denies loss of consciousness fever chills nausea vomiting chest pain shortness of breath.

## 2023-12-07 NOTE — ED ADULT NURSE NOTE - NSFALLHARMRISKINTERV_ED_ALL_ED
Assistance OOB with selected safe patient handling equipment if applicable/Assistance with ambulation/Communicate risk of Fall with Harm to all staff, patient, and family/Provide visual cue: red socks, yellow wristband, yellow gown, etc/Reinforce activity limits and safety measures with patient and family/Bed in lowest position, wheels locked, appropriate side rails in place/Call bell, personal items and telephone in reach/Instruct patient to call for assistance before getting out of bed/chair/stretcher/Non-slip footwear applied when patient is off stretcher/[place] to call system/Physically safe environment - no spills, clutter or unnecessary equipment/Purposeful Proactive Rounding/Room/bathroom lighting operational, light cord in reach Assistance OOB with selected safe patient handling equipment if applicable/Assistance with ambulation/Communicate risk of Fall with Harm to all staff, patient, and family/Provide visual cue: red socks, yellow wristband, yellow gown, etc/Reinforce activity limits and safety measures with patient and family/Bed in lowest position, wheels locked, appropriate side rails in place/Call bell, personal items and telephone in reach/Instruct patient to call for assistance before getting out of bed/chair/stretcher/Non-slip footwear applied when patient is off stretcher/[place] to call system/Physically safe environment - no spills, clutter or unnecessary equipment/Purposeful Proactive Rounding/Room/bathroom lighting operational, light cord in reach

## 2023-12-07 NOTE — ED PROVIDER NOTE - PHYSICAL EXAMINATION
PHYSICAL EXAM:  GEN: NAD, intermittently yelling that he wants to leave   HEAD: Atraumatic  EYES: Clear bilaterally, pupils equal, round and reactive to light.  ENMT:   Poor dentition dry mucous membranes  CARDIAC: Normal rate, regular rhythm. +S1/S2. No murmurs, rubs or gallops.   chest: Nontender chest wall, no crepitus.   Spine: No midline cervical thoracic or lumbar tenderness, no step-offs.  Lower extremities:  No bony tenderness or deformities, stable pelvis.   Back: No CVA tenderness, no ecchymosis or other skin changes.  RESPIRATORY: Breathing unlabored. Breath sounds clear and equal bilaterally.  ABDOMEN:  Soft, nontender, nondistended. No rebound tenderness or guarding.  NEUROLOGICAL: Alert and oriented, no focal deficits, no motor or sensory deficits. CN2-12 intact. Sensation intact x4 extremities.  SKIN: Skin warm and dry. No evidence of rashes or lesions.

## 2023-12-07 NOTE — ED PROVIDER NOTE - NSFOLLOWUPINSTRUCTIONS_ED_ALL_ED_FT
Praful Hopper was seen in the emergency department with weakness.  You had blood work performed which did not show any acute abnormalities.  He had a chest x-ray performed to evaluate the lungs and the heart which appeared normal.  His viral swab came back positive for the coronavirus which is a likely cause of his weakness.    COVID-19 (Coronavirus Disease 2019)    WHAT YOU NEED TO KNOW:    COVID-19 is the disease caused by a coronavirus first discovered in December 2019. The virus can be spread starting 2 to 3 days before symptoms begin. The virus has changed into several new forms (called variants) since it was discovered. A variant may be more easily spread or cause more severe illness than the original form.    DISCHARGE INSTRUCTIONS:    Call your local emergency number (911 in the [place]) if:    You have trouble breathing or shortness of breath at rest.    You have chest pain or pressure that lasts longer than 5 minutes.    You become confused or hard to wake.  Seek care immediately if:    The skin on your face, fingers, or toes look blue or darker than usual.    Call your doctor if:    You have a fever.    You have questions or concerns about your condition or care.  Medicines: You may need any of the following:    Decongestants help reduce nasal congestion and help you breathe more easily. If you take decongestant pills, they may make you feel restless or cause problems with your sleep. Do not use decongestant sprays for more than a few days.    Cough suppressants help reduce coughing. Ask your healthcare provider which type of cough medicine is best for you.    Acetaminophen decreases pain and fever. It is available without a doctor's order. Ask how much to take and how often to take it. Follow directions. Read the labels of all other medicines you are using to see if they also contain acetaminophen, or ask your doctor or pharmacist. Acetaminophen can cause liver damage if not taken correctly.    NSAIDs, such as ibuprofen, help decrease swelling, pain, and fever. This medicine is available with or without a doctor's order. NSAIDs can cause stomach bleeding or kidney problems in certain people. If you take blood thinner medicine, always ask your healthcare provider if NSAIDs are safe for you. Always read the medicine label and follow directions.    How the 2019 coronavirus spreads: Close personal contact with an infected person increases your risk for infection. This means being within 6 feet (2 meters) of the person for at least 15 minutes over 24 hours.    The virus travels in droplets that form when a person talks, sings, coughs, or sneezes. The droplets can also float in the air for minutes or hours. Infection happens when you breathe in the droplets or get them in your eyes or nose.    Person-to-person contact can spread the virus. For example, a person with the virus on his or her hands can spread it by shaking hands with someone.    The virus can stay on objects and surfaces for hours to days. You may become infected by touching the object or surface and then touching your eyes or mouth.  Help lower your risk for COVID-19 during an active outbreak:    Stay home if you are sick or think you may have COVID-19. It is important to stay home if you are waiting for a testing appointment or for test results.    Wash your hands often throughout the day. Use soap and water. Rub your soapy hands together, lacing your fingers, for at least 20 seconds. Rinse with warm, running water. Dry your hands with a clean towel or paper towel. Use hand  that contains alcohol if soap and water are not available. Teach children how to wash their hands and use hand . Praful Hopepr was seen in the emergency department with weakness.  You had blood work performed which did not show any acute abnormalities.  He had a chest x-ray performed to evaluate the lungs and the heart which appeared normal.  His viral swab came back positive for the coronavirus which is a likely cause of his weakness.    COVID-19 (Coronavirus Disease 2019)    WHAT YOU NEED TO KNOW:    COVID-19 is the disease caused by a coronavirus first discovered in December 2019. The virus can be spread starting 2 to 3 days before symptoms begin. The virus has changed into several new forms (called variants) since it was discovered. A variant may be more easily spread or cause more severe illness than the original form.    DISCHARGE INSTRUCTIONS:    Call your local emergency number (911 in the [place]) if:    You have trouble breathing or shortness of breath at rest.    You have chest pain or pressure that lasts longer than 5 minutes.    You become confused or hard to wake.  Seek care immediately if:    The skin on your face, fingers, or toes look blue or darker than usual.    Call your doctor if:    You have a fever.    You have questions or concerns about your condition or care.  Medicines: You may need any of the following:    Decongestants help reduce nasal congestion and help you breathe more easily. If you take decongestant pills, they may make you feel restless or cause problems with your sleep. Do not use decongestant sprays for more than a few days.    Cough suppressants help reduce coughing. Ask your healthcare provider which type of cough medicine is best for you.    Acetaminophen decreases pain and fever. It is available without a doctor's order. Ask how much to take and how often to take it. Follow directions. Read the labels of all other medicines you are using to see if they also contain acetaminophen, or ask your doctor or pharmacist. Acetaminophen can cause liver damage if not taken correctly.    NSAIDs, such as ibuprofen, help decrease swelling, pain, and fever. This medicine is available with or without a doctor's order. NSAIDs can cause stomach bleeding or kidney problems in certain people. If you take blood thinner medicine, always ask your healthcare provider if NSAIDs are safe for you. Always read the medicine label and follow directions.    How the 2019 coronavirus spreads: Close personal contact with an infected person increases your risk for infection. This means being within 6 feet (2 meters) of the person for at least 15 minutes over 24 hours.    The virus travels in droplets that form when a person talks, sings, coughs, or sneezes. The droplets can also float in the air for minutes or hours. Infection happens when you breathe in the droplets or get them in your eyes or nose.    Person-to-person contact can spread the virus. For example, a person with the virus on his or her hands can spread it by shaking hands with someone.    The virus can stay on objects and surfaces for hours to days. You may become infected by touching the object or surface and then touching your eyes or mouth.  Help lower your risk for COVID-19 during an active outbreak:    Stay home if you are sick or think you may have COVID-19. It is important to stay home if you are waiting for a testing appointment or for test results.    Wash your hands often throughout the day. Use soap and water. Rub your soapy hands together, lacing your fingers, for at least 20 seconds. Rinse with warm, running water. Dry your hands with a clean towel or paper towel. Use hand  that contains alcohol if soap and water are not available. Teach children how to wash their hands and use hand .

## 2023-12-08 LAB
CULTURE RESULTS: NO GROWTH — SIGNIFICANT CHANGE UP
CULTURE RESULTS: NO GROWTH — SIGNIFICANT CHANGE UP
SPECIMEN SOURCE: SIGNIFICANT CHANGE UP
SPECIMEN SOURCE: SIGNIFICANT CHANGE UP

## 2023-12-09 ENCOUNTER — INPATIENT (INPATIENT)
Facility: HOSPITAL | Age: 63
LOS: 8 days | Discharge: NURSING, REHAB OR LONG-TERM CARE | DRG: 91 | End: 2023-12-18
Attending: INTERNAL MEDICINE | Admitting: INTERNAL MEDICINE
Payer: MEDICARE

## 2023-12-09 VITALS
HEIGHT: 63 IN | HEART RATE: 94 BPM | WEIGHT: 160.06 LBS | TEMPERATURE: 98 F | RESPIRATION RATE: 16 BRPM | DIASTOLIC BLOOD PRESSURE: 74 MMHG | SYSTOLIC BLOOD PRESSURE: 109 MMHG | OXYGEN SATURATION: 96 %

## 2023-12-09 DIAGNOSIS — R26.81 UNSTEADINESS ON FEET: ICD-10-CM

## 2023-12-09 PROBLEM — Z86.69 PERSONAL HISTORY OF OTHER DISEASES OF THE NERVOUS SYSTEM AND SENSE ORGANS: Chronic | Status: ACTIVE | Noted: 2023-12-07

## 2023-12-09 PROBLEM — K57.90 DIVERTICULOSIS OF INTESTINE, PART UNSPECIFIED, WITHOUT PERFORATION OR ABSCESS WITHOUT BLEEDING: Chronic | Status: ACTIVE | Noted: 2023-12-07

## 2023-12-09 PROBLEM — R56.9 UNSPECIFIED CONVULSIONS: Chronic | Status: ACTIVE | Noted: 2023-12-07

## 2023-12-09 LAB
ALBUMIN SERPL ELPH-MCNC: 3.7 G/DL — SIGNIFICANT CHANGE UP (ref 3.3–5)
ALBUMIN SERPL ELPH-MCNC: 3.7 G/DL — SIGNIFICANT CHANGE UP (ref 3.3–5)
ALP SERPL-CCNC: 72 U/L — SIGNIFICANT CHANGE UP (ref 40–120)
ALP SERPL-CCNC: 72 U/L — SIGNIFICANT CHANGE UP (ref 40–120)
ALT FLD-CCNC: 20 U/L — SIGNIFICANT CHANGE UP (ref 10–45)
ALT FLD-CCNC: 20 U/L — SIGNIFICANT CHANGE UP (ref 10–45)
ANION GAP SERPL CALC-SCNC: 10 MMOL/L — SIGNIFICANT CHANGE UP (ref 5–17)
ANION GAP SERPL CALC-SCNC: 10 MMOL/L — SIGNIFICANT CHANGE UP (ref 5–17)
AST SERPL-CCNC: 33 U/L — SIGNIFICANT CHANGE UP (ref 10–40)
AST SERPL-CCNC: 33 U/L — SIGNIFICANT CHANGE UP (ref 10–40)
BASOPHILS # BLD AUTO: 0.01 K/UL — SIGNIFICANT CHANGE UP (ref 0–0.2)
BASOPHILS # BLD AUTO: 0.01 K/UL — SIGNIFICANT CHANGE UP (ref 0–0.2)
BASOPHILS NFR BLD AUTO: 0.2 % — SIGNIFICANT CHANGE UP (ref 0–2)
BASOPHILS NFR BLD AUTO: 0.2 % — SIGNIFICANT CHANGE UP (ref 0–2)
BILIRUB SERPL-MCNC: 0.4 MG/DL — SIGNIFICANT CHANGE UP (ref 0.2–1.2)
BILIRUB SERPL-MCNC: 0.4 MG/DL — SIGNIFICANT CHANGE UP (ref 0.2–1.2)
BUN SERPL-MCNC: 17 MG/DL — SIGNIFICANT CHANGE UP (ref 7–23)
BUN SERPL-MCNC: 17 MG/DL — SIGNIFICANT CHANGE UP (ref 7–23)
CALCIUM SERPL-MCNC: 9.2 MG/DL — SIGNIFICANT CHANGE UP (ref 8.4–10.5)
CALCIUM SERPL-MCNC: 9.2 MG/DL — SIGNIFICANT CHANGE UP (ref 8.4–10.5)
CHLORIDE SERPL-SCNC: 105 MMOL/L — SIGNIFICANT CHANGE UP (ref 96–108)
CHLORIDE SERPL-SCNC: 105 MMOL/L — SIGNIFICANT CHANGE UP (ref 96–108)
CK SERPL-CCNC: 175 U/L — SIGNIFICANT CHANGE UP (ref 30–200)
CK SERPL-CCNC: 175 U/L — SIGNIFICANT CHANGE UP (ref 30–200)
CO2 SERPL-SCNC: 26 MMOL/L — SIGNIFICANT CHANGE UP (ref 22–31)
CO2 SERPL-SCNC: 26 MMOL/L — SIGNIFICANT CHANGE UP (ref 22–31)
CREAT SERPL-MCNC: 0.69 MG/DL — SIGNIFICANT CHANGE UP (ref 0.5–1.3)
CREAT SERPL-MCNC: 0.69 MG/DL — SIGNIFICANT CHANGE UP (ref 0.5–1.3)
EGFR: 103 ML/MIN/1.73M2 — SIGNIFICANT CHANGE UP
EGFR: 103 ML/MIN/1.73M2 — SIGNIFICANT CHANGE UP
EOSINOPHIL # BLD AUTO: 0.03 K/UL — SIGNIFICANT CHANGE UP (ref 0–0.5)
EOSINOPHIL # BLD AUTO: 0.03 K/UL — SIGNIFICANT CHANGE UP (ref 0–0.5)
EOSINOPHIL NFR BLD AUTO: 0.7 % — SIGNIFICANT CHANGE UP (ref 0–6)
EOSINOPHIL NFR BLD AUTO: 0.7 % — SIGNIFICANT CHANGE UP (ref 0–6)
GLUCOSE SERPL-MCNC: 118 MG/DL — HIGH (ref 70–99)
GLUCOSE SERPL-MCNC: 118 MG/DL — HIGH (ref 70–99)
HCT VFR BLD CALC: 44.7 % — SIGNIFICANT CHANGE UP (ref 39–50)
HCT VFR BLD CALC: 44.7 % — SIGNIFICANT CHANGE UP (ref 39–50)
HGB BLD-MCNC: 15.3 G/DL — SIGNIFICANT CHANGE UP (ref 13–17)
HGB BLD-MCNC: 15.3 G/DL — SIGNIFICANT CHANGE UP (ref 13–17)
IMM GRANULOCYTES NFR BLD AUTO: 0.9 % — SIGNIFICANT CHANGE UP (ref 0–0.9)
IMM GRANULOCYTES NFR BLD AUTO: 0.9 % — SIGNIFICANT CHANGE UP (ref 0–0.9)
LYMPHOCYTES # BLD AUTO: 1.23 K/UL — SIGNIFICANT CHANGE UP (ref 1–3.3)
LYMPHOCYTES # BLD AUTO: 1.23 K/UL — SIGNIFICANT CHANGE UP (ref 1–3.3)
LYMPHOCYTES # BLD AUTO: 28.7 % — SIGNIFICANT CHANGE UP (ref 13–44)
LYMPHOCYTES # BLD AUTO: 28.7 % — SIGNIFICANT CHANGE UP (ref 13–44)
MAGNESIUM SERPL-MCNC: 2.2 MG/DL — SIGNIFICANT CHANGE UP (ref 1.6–2.6)
MAGNESIUM SERPL-MCNC: 2.2 MG/DL — SIGNIFICANT CHANGE UP (ref 1.6–2.6)
MCHC RBC-ENTMCNC: 32.7 PG — SIGNIFICANT CHANGE UP (ref 27–34)
MCHC RBC-ENTMCNC: 32.7 PG — SIGNIFICANT CHANGE UP (ref 27–34)
MCHC RBC-ENTMCNC: 34.2 GM/DL — SIGNIFICANT CHANGE UP (ref 32–36)
MCHC RBC-ENTMCNC: 34.2 GM/DL — SIGNIFICANT CHANGE UP (ref 32–36)
MCV RBC AUTO: 95.5 FL — SIGNIFICANT CHANGE UP (ref 80–100)
MCV RBC AUTO: 95.5 FL — SIGNIFICANT CHANGE UP (ref 80–100)
MONOCYTES # BLD AUTO: 0.43 K/UL — SIGNIFICANT CHANGE UP (ref 0–0.9)
MONOCYTES # BLD AUTO: 0.43 K/UL — SIGNIFICANT CHANGE UP (ref 0–0.9)
MONOCYTES NFR BLD AUTO: 10 % — SIGNIFICANT CHANGE UP (ref 2–14)
MONOCYTES NFR BLD AUTO: 10 % — SIGNIFICANT CHANGE UP (ref 2–14)
NEUTROPHILS # BLD AUTO: 2.55 K/UL — SIGNIFICANT CHANGE UP (ref 1.8–7.4)
NEUTROPHILS # BLD AUTO: 2.55 K/UL — SIGNIFICANT CHANGE UP (ref 1.8–7.4)
NEUTROPHILS NFR BLD AUTO: 59.5 % — SIGNIFICANT CHANGE UP (ref 43–77)
NEUTROPHILS NFR BLD AUTO: 59.5 % — SIGNIFICANT CHANGE UP (ref 43–77)
NRBC # BLD: 0 /100 WBCS — SIGNIFICANT CHANGE UP (ref 0–0)
NRBC # BLD: 0 /100 WBCS — SIGNIFICANT CHANGE UP (ref 0–0)
PHOSPHATE SERPL-MCNC: 2.3 MG/DL — LOW (ref 2.5–4.5)
PHOSPHATE SERPL-MCNC: 2.3 MG/DL — LOW (ref 2.5–4.5)
PLATELET # BLD AUTO: 173 K/UL — SIGNIFICANT CHANGE UP (ref 150–400)
PLATELET # BLD AUTO: 173 K/UL — SIGNIFICANT CHANGE UP (ref 150–400)
POTASSIUM SERPL-MCNC: 4.3 MMOL/L — SIGNIFICANT CHANGE UP (ref 3.5–5.3)
POTASSIUM SERPL-MCNC: 4.3 MMOL/L — SIGNIFICANT CHANGE UP (ref 3.5–5.3)
POTASSIUM SERPL-SCNC: 4.3 MMOL/L — SIGNIFICANT CHANGE UP (ref 3.5–5.3)
POTASSIUM SERPL-SCNC: 4.3 MMOL/L — SIGNIFICANT CHANGE UP (ref 3.5–5.3)
PROT SERPL-MCNC: 6.7 G/DL — SIGNIFICANT CHANGE UP (ref 6–8.3)
PROT SERPL-MCNC: 6.7 G/DL — SIGNIFICANT CHANGE UP (ref 6–8.3)
RBC # BLD: 4.68 M/UL — SIGNIFICANT CHANGE UP (ref 4.2–5.8)
RBC # BLD: 4.68 M/UL — SIGNIFICANT CHANGE UP (ref 4.2–5.8)
RBC # FLD: 12.8 % — SIGNIFICANT CHANGE UP (ref 10.3–14.5)
RBC # FLD: 12.8 % — SIGNIFICANT CHANGE UP (ref 10.3–14.5)
SODIUM SERPL-SCNC: 141 MMOL/L — SIGNIFICANT CHANGE UP (ref 135–145)
SODIUM SERPL-SCNC: 141 MMOL/L — SIGNIFICANT CHANGE UP (ref 135–145)
WBC # BLD: 4.29 K/UL — SIGNIFICANT CHANGE UP (ref 3.8–10.5)
WBC # BLD: 4.29 K/UL — SIGNIFICANT CHANGE UP (ref 3.8–10.5)
WBC # FLD AUTO: 4.29 K/UL — SIGNIFICANT CHANGE UP (ref 3.8–10.5)
WBC # FLD AUTO: 4.29 K/UL — SIGNIFICANT CHANGE UP (ref 3.8–10.5)

## 2023-12-09 PROCEDURE — 99285 EMERGENCY DEPT VISIT HI MDM: CPT

## 2023-12-09 RX ORDER — ARIPIPRAZOLE 15 MG/1
10 TABLET ORAL DAILY
Refills: 0 | Status: DISCONTINUED | OUTPATIENT
Start: 2023-12-09 | End: 2023-12-18

## 2023-12-09 RX ORDER — CHLORHEXIDINE GLUCONATE 213 G/1000ML
1 SOLUTION TOPICAL DAILY
Refills: 0 | Status: DISCONTINUED | OUTPATIENT
Start: 2023-12-09 | End: 2023-12-18

## 2023-12-09 RX ORDER — DIVALPROEX SODIUM 500 MG/1
500 TABLET, DELAYED RELEASE ORAL
Refills: 0 | Status: DISCONTINUED | OUTPATIENT
Start: 2023-12-09 | End: 2023-12-18

## 2023-12-09 RX ORDER — PANTOPRAZOLE SODIUM 20 MG/1
40 TABLET, DELAYED RELEASE ORAL
Refills: 0 | Status: DISCONTINUED | OUTPATIENT
Start: 2023-12-09 | End: 2023-12-18

## 2023-12-09 RX ORDER — HEPARIN SODIUM 5000 [USP'U]/ML
5000 INJECTION INTRAVENOUS; SUBCUTANEOUS EVERY 12 HOURS
Refills: 0 | Status: DISCONTINUED | OUTPATIENT
Start: 2023-12-09 | End: 2023-12-18

## 2023-12-09 RX ADMIN — HEPARIN SODIUM 5000 UNIT(S): 5000 INJECTION INTRAVENOUS; SUBCUTANEOUS at 22:11

## 2023-12-09 NOTE — PHYSICAL THERAPY INITIAL EVALUATION ADULT - PERTINENT HX OF CURRENT PROBLEM, REHAB EVAL
Patient is a 63-year-old male past medical history of cerebral palsy presenting for falls/difficulty ambulating.  Patient limited historian, states that he feels fine without pain.  States that he does not think he fell, feels normal without complaints otherwise.  Aide at bedside states that he has been having difficulty ambulating has been using his walker but slumping over and lowering himself his dead weight to the floor.  This has been an ongoing issue, no other noticed changes.

## 2023-12-09 NOTE — PATIENT PROFILE ADULT - CENTRAL VENOUS CATHETER/PICC LINE
Scheduled procedure with pt wife via phone   Scheduled Via: Case Creation for [facility]   Procedure date: 3/6   Procedure time: 730 am   Location :[facility]  Insurance confirmed as Payor: [employer] / Plan: CHOICE PLUS/[employer] / Product Type: PPO MISC , will be the same at time of procedure: Yes   The following have been confirmed:     Latex Allergy No   Diabetic No   Sleep Apnea No   Diuretic/Water pill No   Defibrillator No   Pacemaker No   ASA No     no

## 2023-12-09 NOTE — PATIENT PROFILE ADULT - FALL HARM RISK - HARM RISK INTERVENTIONS
Assistance with ambulation/Assistance OOB with selected safe patient handling equipment/Communicate Risk of Fall with Harm to all staff/Discuss with provider need for PT consult/Monitor gait and stability/Provide patient with walking aids - walker, cane, crutches/Reinforce activity limits and safety measures with patient and family/Tailored Fall Risk Interventions/Use of alarms - bed, chair and/or voice tab/Visual Cue: Yellow wristband and red socks/Bed in lowest position, wheels locked, appropriate side rails in place/Call bell, personal items and telephone in reach/Instruct patient to call for assistance before getting out of bed or chair/Non-slip footwear when patient is out of bed/[place] to call system/Physically safe environment - no spills, clutter or unnecessary equipment/Purposeful Proactive Rounding/Room/bathroom lighting operational, light cord in reach Assistance with ambulation/Assistance OOB with selected safe patient handling equipment/Communicate Risk of Fall with Harm to all staff/Discuss with provider need for PT consult/Monitor gait and stability/Provide patient with walking aids - walker, cane, crutches/Reinforce activity limits and safety measures with patient and family/Tailored Fall Risk Interventions/Use of alarms - bed, chair and/or voice tab/Visual Cue: Yellow wristband and red socks/Bed in lowest position, wheels locked, appropriate side rails in place/Call bell, personal items and telephone in reach/Instruct patient to call for assistance before getting out of bed or chair/Non-slip footwear when patient is out of bed/[place] to call system/Physically safe environment - no spills, clutter or unnecessary equipment/Purposeful Proactive Rounding/Room/bathroom lighting operational, light cord in reach

## 2023-12-09 NOTE — ED PROVIDER NOTE - DIFFERENTIAL DIAGNOSIS
Ddx includes, however, is not limited to: deconditioning, motor weakness, metabolic d/o, behavioral, other Differential Diagnosis

## 2023-12-09 NOTE — H&P ADULT - NSHPPHYSICALEXAM_GEN_ALL_CORE
pt. seen and examined     Vital Signs Last 24 Hrs  T(C): 36.7 (09 Dec 2023 18:14), Max: 36.9 (09 Dec 2023 09:54)  T(F): 98.1 (09 Dec 2023 18:14), Max: 98.4 (09 Dec 2023 09:54)  HR: 77 (09 Dec 2023 18:14) (77 - 94)  BP: 129/81 (09 Dec 2023 18:14) (109/74 - 129/81)  BP(mean): --  RR: 19 (09 Dec 2023 18:14) (16 - 19)  SpO2: 98% (09 Dec 2023 18:14) (96% - 98%)    Parameters below as of 09 Dec 2023 18:14  Patient On (Oxygen Delivery Method): room air    heent : nc/at  neck ; supple, no JVD  lungs : b/l clear , no w/r/r  heart:" s1s2 nml  abd : soft, NABS  ext : no e/c/c, pulses 1 +  neuro: hx of cerbral palsy , no focal deficit

## 2023-12-09 NOTE — ED ADULT NURSE NOTE - NSFALLHARMRISKINTERV_ED_ALL_ED
Assistance OOB with selected safe patient handling equipment if applicable/Assistance with ambulation/Communicate risk of Fall with Harm to all staff, patient, and family/Monitor gait and stability/Monitor for mental status changes and reorient to person, place, and time, as needed/Move patient closer to nursing station/within visual sight of ED staff/Provide patient with walking aids/Provide visual cue: red socks, yellow wristband, yellow gown, etc/Reinforce activity limits and safety measures with patient and family/Toileting schedule using arm’s reach rule for commode and bathroom/Use of alarms - bed, stretcher, chair and/or video monitoring/Bed in lowest position, wheels locked, appropriate side rails in place/Call bell, personal items and telephone in reach/Instruct patient to call for assistance before getting out of bed/chair/stretcher/Non-slip footwear applied when patient is off stretcher/[place] to call system/Physically safe environment - no spills, clutter or unnecessary equipment/Purposeful Proactive Rounding/Room/bathroom lighting operational, light cord in reach Assistance OOB with selected safe patient handling equipment if applicable/Assistance with ambulation/Communicate risk of Fall with Harm to all staff, patient, and family/Monitor gait and stability/Monitor for mental status changes and reorient to person, place, and time, as needed/Move patient closer to nursing station/within visual sight of ED staff/Provide patient with walking aids/Provide visual cue: red socks, yellow wristband, yellow gown, etc/Reinforce activity limits and safety measures with patient and family/Toileting schedule using arm’s reach rule for commode and bathroom/Use of alarms - bed, stretcher, chair and/or video monitoring/Bed in lowest position, wheels locked, appropriate side rails in place/Call bell, personal items and telephone in reach/Instruct patient to call for assistance before getting out of bed/chair/stretcher/Non-slip footwear applied when patient is off stretcher/[place] to call system/Physically safe environment - no spills, clutter or unnecessary equipment/Purposeful Proactive Rounding/Room/bathroom lighting operational, light cord in reach

## 2023-12-09 NOTE — ED PROVIDER NOTE - OBJECTIVE STATEMENT
Patient is a 63-year-old male past medical history of cervical palsy presenting for falls/difficulty ambulating.  Patient limited historian, states that he feels fine without pain.  States that he does not think he fell, feels normal without complaints otherwise.  Aide at bedside states that he has been having difficulty ambulating has been using his walker but slumping over and lowering himself his dead weight to the floor.  This has been an ongoing issue, no other noticed changes. Patient is a 63-year-old male past medical history of cerebral palsy presenting for falls/difficulty ambulating.  Patient limited historian, states that he feels fine without pain.  States that he does not think he fell, feels normal without complaints otherwise.  Aide at bedside states that he has been having difficulty ambulating has been using his walker but slumping over and lowering himself his dead weight to the floor.  This has been an ongoing issue, no other noticed changes.

## 2023-12-09 NOTE — H&P ADULT - HISTORY OF PRESENT ILLNESS
63-year-old male past medical history of cerebral palsy presenting for falls/difficulty ambulating.  Patient limited historian, states that he feels fine without pain.  States that he does not think he fell, feels normal without complaints otherwise.  Aide at bedside states that he has been having difficulty ambulating has been using his walker but slumping over and lowering himself his dead weight to the floor.  This has been an ongoing issue, no other noticed changes.

## 2023-12-09 NOTE — PATIENT PROFILE ADULT - NSFALLSECTIONLABEL_GEN_A_CORE
DATE OF SURGERY:  08/13/2020    SURGEON:  Nii Don MD    REFERRING PHYSICIAN: Nii Don MD    PREOPERATIVE DIAGNOSIS(ES):  Menorrhagia, endometrial polyp.    POSTOPERATIVE DIAGNOSIS(ES):  Menorrhagia, endometrial polyp.    PROCEDURE:  Diagnostic hysteroscopy with NovaSure endometrial ablation.    ANESTHESIA:  MAC.    ESTIMATED BLOOD LOSS:  Minimal.    FINDINGS:  Uterus was enlarged with a uterine length of 10.5.  The measurements for the NovaSure ablation were length 5.5 and width 4.3.  Endometrial cavity was assessed.  No definitive polyp was noted, though there was a lot of endometrial tissue.  Following the procedure, thorough burn noted throughout.    DESCRIPTION OF PROCEDURE:  The patient was taken to the operating room and placed supine on the operating table where anesthesia was administered.  The patient was then placed in the dorsal lithotomy position, prepped and draped in usual sterile fashion.  A red rubber catheter was placed, and the bladder was emptied of urine sterilely.  A deep weighted speculum was placed as well as an anterior retractor, and single-tooth tenaculum was placed on the anterior lip of the cervix.  The uterus was sounded, and the uterine length was determined.  The cervix was dilated to allow introduction of a 3 mm hysteroscope. With saline as the medium, uterine cavity was assessed.  Once the cavity was assessed, the hysteroscope was removed, and the cervix was dilated to allow introduction of the NovaSure ablation instrument.  The width was determined, and the measurements were set on the machine.  The machine was assessed and passed inspection.  The instrument was enabled, and a NovaSure ablation burn was then performed.  Following the cessation of the procedure, the instrument was removed.  Hysteroscope was placed back in the cavity, and a thorough burn was noted throughout.  All instruments were then removed from the vagina.  Hemostasis was noted to be adequate.  All  sponge and needle counts were correct.  The patient was taken to recovery room in satisfactory condition.        ______________________________  Nii Don MD  [id number]      D:  08/13/2020 13:17:21  T:  08/13/2020 17:25:37   JOSEPH/marj   Job:  [id number]              Electronically Signed On 08.14.2020 07:01  ___________________________________________________   Nii Don MD     .

## 2023-12-09 NOTE — PHYSICAL THERAPY INITIAL EVALUATION ADULT - ADDITIONAL COMMENTS
as per pt and aide at bedside: PTA pt was living in a  group home 2-3 steps to enter home, and was using a Rolator to ambulate. Pt required supervision for gait, ambulating short distances only.

## 2023-12-09 NOTE — ED PROVIDER NOTE - CLINICAL SUMMARY MEDICAL DECISION MAKING FREE TEXT BOX
Patient is a 63-year-old male past medical history of cervical palsy presenting for falls/difficulty ambulating. Currently with vital signs within normal limits and stable.  Presenting for difficulty ambulating with the patient's latches to the ground without any new complaints of pain, loss of consciousness, or any apparent focal neurologic deficits on exam.  Patient appears at baseline per aide and per exam and per chart review.  Differential includes but not limited to complication of cerebral palsy/intellectual disability versus less likely metabolic etiology given normal labs on chart review 2 days ago, without focal neurologic deficits unlikely CVA or intracerebral process.  No exam findings concerning for acute traumatic sequelae.  To evaluate labs, consult PT for assessment, reassess. Patient is a 63-year-old male past medical history of cerebral palsy presenting for falls/difficulty ambulating. Currently with vital signs within normal limits and stable.  Presenting for difficulty ambulating with the patient's latches to the ground without any new complaints of pain, loss of consciousness, or any apparent focal neurologic deficits on exam.  Patient appears at baseline per aide and per exam and per chart review.  Differential includes but not limited to complication of cerebral palsy/intellectual disability versus less likely metabolic etiology given normal labs on chart review 2 days ago, without focal neurologic deficits unlikely CVA or intracerebral process.  No exam findings concerning for acute traumatic sequelae.  To evaluate labs, consult PT for assessment, reassess. Patient is a 63-year-old male past medical history of cerebral palsy presenting for falls/difficulty ambulating. Currently with vital signs within normal limits and stable.  Presenting for difficulty ambulating with the patient's latches to the ground without any new complaints of pain, loss of consciousness, or any apparent focal neurologic deficits on exam.  Patient appears at baseline per aide and per exam and per chart review.  Differential includes but not limited to complication of cerebral palsy/intellectual disability versus less likely metabolic etiology given normal labs on chart review 2 days ago, without focal neurologic deficits unlikely CVA or intracerebral process.  No exam findings concerning for acute traumatic sequelae.  To evaluate labs, consult PT for assessment, reassess.    LORENA Durbin MD: Agree with resident/ACP MDM, assessment and plan as above.

## 2023-12-09 NOTE — H&P ADULT - NSHPLABSRESULTS_GEN_ALL_CORE
15.3   4.29  )-----------( 173      ( 09 Dec 2023 11:11 )             44.7   12-09    141  |  105  |  17  ----------------------------<  118<H>  4.3   |  26  |  0.69    Ca    9.2      09 Dec 2023 11:11  Phos  2.3     12-09  Mg     2.2     12-09    TPro  6.7  /  Alb  3.7  /  TBili  0.4  /  DBili  x   /  AST  33  /  ALT  20  /  AlkPhos  72  12-09

## 2023-12-09 NOTE — ED PROVIDER NOTE - PROGRESS NOTE DETAILS
Parveen Beard MD PGY2: Labs unctionable. PT not available. Pt cannot return to facility with current ambulatory failure, to admit.

## 2023-12-09 NOTE — PHYSICAL THERAPY INITIAL EVALUATION ADULT - RANGE OF MOTION EXAMINATION, REHAB EVAL
bilat plantar flexion contractures./bilateral upper extremity ROM was WFL (within functional limits)/bilateral lower extremity ROM was WFL (within functional limits)

## 2023-12-09 NOTE — PATIENT PROFILE ADULT - FUNCTIONAL ASSESSMENT - BASIC MOBILITY 6.
3-calculated by average/Not able to assess (calculate score using [facility] averaging method)  3-calculated by average/Not able to assess (calculate score using [facility] averaging method)

## 2023-12-09 NOTE — H&P ADULT - ASSESSMENT
A/P     # recurrent fall / difficulty ambulation   -no focal deficit  less likely CVA  will need PT consult   difficulty staying home , may need STR     Hx of cerbral palsy :  -stable   supportive care     H of seizures   -c/w home meds    dispo: consult psych , may be cause of frequent fall : will call in am  A/P     # recurrent fall / difficulty ambulation   -no focal deficit  less likely CVA  will need PT consult   difficulty staying home , may need STR     Covid -19 pos   asymptomatic   monitor oxygen sat   hold off on RDV/ steroids     Hx of cerbral palsy :  -stable   supportive care     H of seizures   -c/w home meds    dispo: consult psych , may be cause of frequent fall : will call in am

## 2023-12-09 NOTE — ED ADULT NURSE NOTE - OBJECTIVE STATEMENT
Patient  is  alert  and  oriented x2. Color  is  good  and  skin warm to touch.  He  is  c/o  general weakness. Dry cough noted. Patient  tested positive  for  Covid 2 days ago.  He has been afebrile.

## 2023-12-09 NOTE — PHYSICAL THERAPY INITIAL EVALUATION ADULT - ACTIVE RANGE OF MOTION EXAMINATION, REHAB EVAL
bilat plantar flexion contractures./bilateral upper extremity Active ROM was WFL (within functional limits)/bilateral  lower extremity Active ROM was WFL (within functional limits)

## 2023-12-09 NOTE — ED PROVIDER NOTE - PHYSICAL EXAMINATION
CONSTITUTIONAL: awake; in no acute distress.   SKIN: warm, dry  HEAD: Normocephalic; atraumatic.  EYES: no conjunctival injection. PERRL.   ENT: No nasal discharge; airway clear.  NECK: Supple; non tender.  CARD: S1, S2 normal; no murmurs, gallops, or rubs. Regular rate and rhythm.   RESP: No wheezes, rales or rhonchi. Good air movement bilaterally.   ABD: soft ntnd, no guarding, no distention, no rigidity.   EXT: Ambulates independently.  No cyanosis or edema.   LYMPH: No acute cervical adenopathy.  NEURO: Alert, oriented, grossly unremarkable  PSYCH: Cooperative, appropriate. CONSTITUTIONAL: awake; in no acute distress.   SKIN: warm, dry  HEAD: Normocephalic; atraumatic.  EYES: no conjunctival injection. R pupil round reactive, L pupil pt refusing to open L eye, EOMI  ENT: No nasal discharge; airway clear.  NECK: Supple; non tender.  CARD: S1, S2 normal; no murmurs, gallops, or rubs. Regular rate and rhythm.   RESP: No wheezes, rales or rhonchi. Good air movement bilaterally.   ABD: soft ntnd, no guarding, no distention, no rigidity.   EXT: No cyanosis or edema. Pelvis stable. Extremities without obvious bony deformity or tenderness.  NEURO: Alert, answering questions appropriately, symmetric face/smile, CN 2-12 grossly intact, moves all extremities with full strength  PSYCH: Cooperative, appropriate.

## 2023-12-10 LAB
HCV AB S/CO SERPL IA: 0.06 S/CO — SIGNIFICANT CHANGE UP (ref 0–0.99)
HCV AB S/CO SERPL IA: 0.06 S/CO — SIGNIFICANT CHANGE UP (ref 0–0.99)
HCV AB SERPL-IMP: SIGNIFICANT CHANGE UP
HCV AB SERPL-IMP: SIGNIFICANT CHANGE UP
MRSA PCR RESULT.: SIGNIFICANT CHANGE UP
MRSA PCR RESULT.: SIGNIFICANT CHANGE UP
S AUREUS DNA NOSE QL NAA+PROBE: SIGNIFICANT CHANGE UP
S AUREUS DNA NOSE QL NAA+PROBE: SIGNIFICANT CHANGE UP
VALPROATE SERPL-MCNC: 38 UG/ML — LOW (ref 50–100)
VALPROATE SERPL-MCNC: 38 UG/ML — LOW (ref 50–100)

## 2023-12-10 RX ORDER — SODIUM,POTASSIUM PHOSPHATES 278-250MG
2 POWDER IN PACKET (EA) ORAL ONCE
Refills: 0 | Status: COMPLETED | OUTPATIENT
Start: 2023-12-10 | End: 2023-12-10

## 2023-12-10 RX ADMIN — DIVALPROEX SODIUM 500 MILLIGRAM(S): 500 TABLET, DELAYED RELEASE ORAL at 17:04

## 2023-12-10 RX ADMIN — DIVALPROEX SODIUM 500 MILLIGRAM(S): 500 TABLET, DELAYED RELEASE ORAL at 04:54

## 2023-12-10 RX ADMIN — ARIPIPRAZOLE 10 MILLIGRAM(S): 15 TABLET ORAL at 11:32

## 2023-12-10 RX ADMIN — PANTOPRAZOLE SODIUM 40 MILLIGRAM(S): 20 TABLET, DELAYED RELEASE ORAL at 04:54

## 2023-12-10 RX ADMIN — HEPARIN SODIUM 5000 UNIT(S): 5000 INJECTION INTRAVENOUS; SUBCUTANEOUS at 04:54

## 2023-12-10 RX ADMIN — CHLORHEXIDINE GLUCONATE 1 APPLICATION(S): 213 SOLUTION TOPICAL at 11:24

## 2023-12-10 RX ADMIN — Medication 2 PACKET(S): at 18:35

## 2023-12-10 RX ADMIN — HEPARIN SODIUM 5000 UNIT(S): 5000 INJECTION INTRAVENOUS; SUBCUTANEOUS at 17:00

## 2023-12-10 NOTE — PROGRESS NOTE ADULT - ASSESSMENT
A/P     # recurrent fall / difficulty ambulation   -no focal deficit  less likely CVA  will need PT consult   difficulty staying home , may need STR     Covid -19 pos   asymptomatic   monitor oxygen sat   hold off on RDV/ steroids     Hx of cerbral palsy :  -stable   supportive care     H of seizures   -c/w home meds    dispo: PT eval : may need placement

## 2023-12-11 RX ORDER — SODIUM,POTASSIUM PHOSPHATES 278-250MG
2 POWDER IN PACKET (EA) ORAL EVERY 4 HOURS
Refills: 0 | Status: COMPLETED | OUTPATIENT
Start: 2023-12-11 | End: 2023-12-11

## 2023-12-11 RX ADMIN — ARIPIPRAZOLE 10 MILLIGRAM(S): 15 TABLET ORAL at 12:57

## 2023-12-11 RX ADMIN — Medication 2 PACKET(S): at 23:16

## 2023-12-11 RX ADMIN — PANTOPRAZOLE SODIUM 40 MILLIGRAM(S): 20 TABLET, DELAYED RELEASE ORAL at 06:11

## 2023-12-11 RX ADMIN — Medication 2 PACKET(S): at 17:11

## 2023-12-11 RX ADMIN — HEPARIN SODIUM 5000 UNIT(S): 5000 INJECTION INTRAVENOUS; SUBCUTANEOUS at 17:10

## 2023-12-11 RX ADMIN — DIVALPROEX SODIUM 500 MILLIGRAM(S): 500 TABLET, DELAYED RELEASE ORAL at 05:32

## 2023-12-11 RX ADMIN — HEPARIN SODIUM 5000 UNIT(S): 5000 INJECTION INTRAVENOUS; SUBCUTANEOUS at 05:33

## 2023-12-11 RX ADMIN — CHLORHEXIDINE GLUCONATE 1 APPLICATION(S): 213 SOLUTION TOPICAL at 12:22

## 2023-12-11 NOTE — PROGRESS NOTE ADULT - ASSESSMENT
A/P     # recurrent fall / difficulty ambulation   -no focal deficit  less likely CVA  will need PT consult   difficulty staying home , may need STR     Covid -19 pos   asymptomatic   monitor oxygen sat   hold off on RDV/ steroids     Hx of cerbral palsy :  -stable   supportive care     H of seizures   -c/w home meds    dispo: PT eval pending   pt. from group home and would probably need STR on d/c

## 2023-12-12 RX ADMIN — PANTOPRAZOLE SODIUM 40 MILLIGRAM(S): 20 TABLET, DELAYED RELEASE ORAL at 06:27

## 2023-12-12 RX ADMIN — HEPARIN SODIUM 5000 UNIT(S): 5000 INJECTION INTRAVENOUS; SUBCUTANEOUS at 17:00

## 2023-12-12 RX ADMIN — HEPARIN SODIUM 5000 UNIT(S): 5000 INJECTION INTRAVENOUS; SUBCUTANEOUS at 06:27

## 2023-12-12 RX ADMIN — DIVALPROEX SODIUM 500 MILLIGRAM(S): 500 TABLET, DELAYED RELEASE ORAL at 16:59

## 2023-12-12 RX ADMIN — DIVALPROEX SODIUM 500 MILLIGRAM(S): 500 TABLET, DELAYED RELEASE ORAL at 06:27

## 2023-12-12 RX ADMIN — CHLORHEXIDINE GLUCONATE 1 APPLICATION(S): 213 SOLUTION TOPICAL at 11:17

## 2023-12-12 RX ADMIN — ARIPIPRAZOLE 10 MILLIGRAM(S): 15 TABLET ORAL at 12:13

## 2023-12-12 NOTE — PROGRESS NOTE ADULT - ASSESSMENT
A/P     # recurrent fall / difficulty ambulation   likjely deconditioning     Covid -19 pos   asymptomatic   monitor oxygen sat   hold off on RDV/ steroids     Hx of cerbral palsy :  -stable   supportive care     H of seizures   -c/w home meds    dispo: pt. is asymptomatic from covid, medically stable to be d/c   plan for PT eval and recommendation that he can go back to intermediate ( group home) or STR  A/P     # recurrent fall / difficulty ambulation   likjely deconditioning     Covid -19 pos   asymptomatic   monitor oxygen sat   hold off on RDV/ steroids     Hx of cerbral palsy :  -stable   supportive care     H of seizures   -c/w home meds    dispo: pt. is asymptomatic from covid, medically stable to be d/c   plan for PT eval and recommendation that he can go back to senior living ( group home) or STR

## 2023-12-13 RX ADMIN — DIVALPROEX SODIUM 500 MILLIGRAM(S): 500 TABLET, DELAYED RELEASE ORAL at 05:35

## 2023-12-13 RX ADMIN — HEPARIN SODIUM 5000 UNIT(S): 5000 INJECTION INTRAVENOUS; SUBCUTANEOUS at 05:34

## 2023-12-13 RX ADMIN — DIVALPROEX SODIUM 500 MILLIGRAM(S): 500 TABLET, DELAYED RELEASE ORAL at 17:25

## 2023-12-13 RX ADMIN — ARIPIPRAZOLE 10 MILLIGRAM(S): 15 TABLET ORAL at 11:52

## 2023-12-13 RX ADMIN — CHLORHEXIDINE GLUCONATE 1 APPLICATION(S): 213 SOLUTION TOPICAL at 11:54

## 2023-12-13 RX ADMIN — PANTOPRAZOLE SODIUM 40 MILLIGRAM(S): 20 TABLET, DELAYED RELEASE ORAL at 05:35

## 2023-12-13 RX ADMIN — HEPARIN SODIUM 5000 UNIT(S): 5000 INJECTION INTRAVENOUS; SUBCUTANEOUS at 17:25

## 2023-12-13 NOTE — PROGRESS NOTE ADULT - ASSESSMENT
A/P     # recurrent fall / difficulty ambulation   likjely deconditioning     Covid -19 pos   asymptomatic   monitor oxygen sat   hold off on RDV/ steroids     Hx of cerbral palsy :  -stable   supportive care     H of seizures   -c/w home meds    dispo: pt. is asymptomatic from covid, medically stable to be d/c   plan for PT eval and recommendation that [facility] can [facility] to [facility] ( [facility]) or [facility]  A/P     # recurrent fall / difficulty ambulation   likjely deconditioning     Covid -19 pos   asymptomatic   monitor oxygen sat   hold off on RDV/ steroids     Hx of cerbral palsy :  -stable   supportive care     H of seizures   -c/w home meds    dispo: pt. is asymptomatic from covid, medically stable to be d/c   plan for PT eval and recommendation that he can go back to skilled nursing ( group home) or STR

## 2023-12-14 RX ADMIN — HEPARIN SODIUM 5000 UNIT(S): 5000 INJECTION INTRAVENOUS; SUBCUTANEOUS at 06:02

## 2023-12-14 RX ADMIN — DIVALPROEX SODIUM 500 MILLIGRAM(S): 500 TABLET, DELAYED RELEASE ORAL at 17:57

## 2023-12-14 RX ADMIN — CHLORHEXIDINE GLUCONATE 1 APPLICATION(S): 213 SOLUTION TOPICAL at 13:52

## 2023-12-14 RX ADMIN — DIVALPROEX SODIUM 500 MILLIGRAM(S): 500 TABLET, DELAYED RELEASE ORAL at 06:02

## 2023-12-14 RX ADMIN — HEPARIN SODIUM 5000 UNIT(S): 5000 INJECTION INTRAVENOUS; SUBCUTANEOUS at 17:57

## 2023-12-14 RX ADMIN — ARIPIPRAZOLE 10 MILLIGRAM(S): 15 TABLET ORAL at 13:53

## 2023-12-14 RX ADMIN — PANTOPRAZOLE SODIUM 40 MILLIGRAM(S): 20 TABLET, DELAYED RELEASE ORAL at 06:02

## 2023-12-14 NOTE — PROGRESS NOTE ADULT - ASSESSMENT
A/P     # recurrent fall / difficulty ambulation   likjely deconditioning     Covid -19 pos   asymptomatic   monitor oxygen sat   hold off on RDV/ steroids     Hx of cerbral palsy :  -stable   supportive care     H of seizures   -c/w home meds    dispo: pt. is asymptomatic from covid, medically stable to be d/c   awaiting placement to rehab

## 2023-12-15 RX ADMIN — HEPARIN SODIUM 5000 UNIT(S): 5000 INJECTION INTRAVENOUS; SUBCUTANEOUS at 18:01

## 2023-12-15 RX ADMIN — CHLORHEXIDINE GLUCONATE 1 APPLICATION(S): 213 SOLUTION TOPICAL at 12:52

## 2023-12-15 RX ADMIN — DIVALPROEX SODIUM 500 MILLIGRAM(S): 500 TABLET, DELAYED RELEASE ORAL at 05:14

## 2023-12-15 RX ADMIN — DIVALPROEX SODIUM 500 MILLIGRAM(S): 500 TABLET, DELAYED RELEASE ORAL at 17:58

## 2023-12-15 RX ADMIN — ARIPIPRAZOLE 10 MILLIGRAM(S): 15 TABLET ORAL at 12:52

## 2023-12-15 RX ADMIN — PANTOPRAZOLE SODIUM 40 MILLIGRAM(S): 20 TABLET, DELAYED RELEASE ORAL at 05:14

## 2023-12-15 RX ADMIN — HEPARIN SODIUM 5000 UNIT(S): 5000 INJECTION INTRAVENOUS; SUBCUTANEOUS at 05:14

## 2023-12-16 RX ADMIN — DIVALPROEX SODIUM 500 MILLIGRAM(S): 500 TABLET, DELAYED RELEASE ORAL at 05:15

## 2023-12-16 RX ADMIN — HEPARIN SODIUM 5000 UNIT(S): 5000 INJECTION INTRAVENOUS; SUBCUTANEOUS at 17:46

## 2023-12-16 RX ADMIN — CHLORHEXIDINE GLUCONATE 1 APPLICATION(S): 213 SOLUTION TOPICAL at 13:12

## 2023-12-16 RX ADMIN — DIVALPROEX SODIUM 500 MILLIGRAM(S): 500 TABLET, DELAYED RELEASE ORAL at 17:46

## 2023-12-16 RX ADMIN — HEPARIN SODIUM 5000 UNIT(S): 5000 INJECTION INTRAVENOUS; SUBCUTANEOUS at 05:15

## 2023-12-16 RX ADMIN — ARIPIPRAZOLE 10 MILLIGRAM(S): 15 TABLET ORAL at 13:13

## 2023-12-16 RX ADMIN — PANTOPRAZOLE SODIUM 40 MILLIGRAM(S): 20 TABLET, DELAYED RELEASE ORAL at 06:16

## 2023-12-17 RX ADMIN — DIVALPROEX SODIUM 500 MILLIGRAM(S): 500 TABLET, DELAYED RELEASE ORAL at 17:43

## 2023-12-17 RX ADMIN — DIVALPROEX SODIUM 500 MILLIGRAM(S): 500 TABLET, DELAYED RELEASE ORAL at 05:20

## 2023-12-17 RX ADMIN — HEPARIN SODIUM 5000 UNIT(S): 5000 INJECTION INTRAVENOUS; SUBCUTANEOUS at 05:20

## 2023-12-17 RX ADMIN — HEPARIN SODIUM 5000 UNIT(S): 5000 INJECTION INTRAVENOUS; SUBCUTANEOUS at 17:44

## 2023-12-17 RX ADMIN — ARIPIPRAZOLE 10 MILLIGRAM(S): 15 TABLET ORAL at 13:19

## 2023-12-17 RX ADMIN — CHLORHEXIDINE GLUCONATE 1 APPLICATION(S): 213 SOLUTION TOPICAL at 13:18

## 2023-12-17 RX ADMIN — PANTOPRAZOLE SODIUM 40 MILLIGRAM(S): 20 TABLET, DELAYED RELEASE ORAL at 05:19

## 2023-12-17 NOTE — PROGRESS NOTE ADULT - REASON FOR ADMISSION
recurrent fall/ difficulty ambulating

## 2023-12-17 NOTE — PROGRESS NOTE ADULT - SUBJECTIVE AND OBJECTIVE BOX
Patient is a 63y old  Male who presents with a chief complaint of recurrent fall/ difficulty ambulating (09 Dec 2023 16:18)      INTERVAL HPI/OVERNIGHT EVENTS:  T(C): 37.1 (12-10-23 @ 20:50), Max: 37.1 (12-10-23 @ 20:50)  HR: 74 (12-10-23 @ 20:50) (74 - 89)  BP: 112/74 (12-10-23 @ 20:50) (110/75 - 114/76)  RR: 18 (12-10-23 @ 20:50) (18 - 19)  SpO2: 95% (12-10-23 @ 20:50) (95% - 98%)  Wt(kg): --  I&O's Summary    09 Dec 2023 07:01  -  10 Dec 2023 07:00  --------------------------------------------------------  IN: 270 mL / OUT: 0 mL / NET: 270 mL    10 Dec 2023 07:01  -  10 Dec 2023 23:26  --------------------------------------------------------  IN: 720 mL / OUT: 0 mL / NET: 720 mL        PAST MEDICAL & SURGICAL HISTORY:  MR (mental retardation)      H/O cerebral palsy      Seizure      Diverticulosis      No significant past surgical history          SOCIAL HISTORY  Alcohol:  Tobacco:  Illicit substance use:    FAMILY HISTORY:    REVIEW OF SYSTEMS:  CONSTITUTIONAL: No fever, weight loss, or fatigue  EYES: No eye pain, visual disturbances, or discharge  ENMT:  No difficulty hearing, tinnitus, vertigo; No sinus or throat pain  NECK: No pain or stiffness  RESPIRATORY: No cough, wheezing, chills or hemoptysis; No shortness of breath  CARDIOVASCULAR: No chest pain, palpitations, dizziness, or leg swelling  GASTROINTESTINAL: No abdominal or epigastric pain. No nausea, vomiting, or hematemesis; No diarrhea or constipation. No melena or hematochezia.  GENITOURINARY: No dysuria, frequency, hematuria, or incontinence  NEUROLOGICAL: No headaches, memory loss, loss of strength, numbness, or tremors  SKIN: No itching, burning, rashes, or lesions   LYMPH NODES: No enlarged glands  ENDOCRINE: No heat or cold intolerance; No hair loss  MUSCULOSKELETAL: No joint pain or swelling; No muscle, back, or extremity pain  PSYCHIATRIC: No depression, anxiety, mood swings, or difficulty sleeping  HEME/LYMPH: No easy bruising, or bleeding gums  ALLERY AND IMMUNOLOGIC: No hives or eczema    RADIOLOGY & ADDITIONAL TESTS:    Imaging Personally Reviewed:  [ ] YES  [ ] NO    Consultant(s) Notes Reviewed:  [ ] YES  [ ] NO    PHYSICAL EXAM:  GENERAL: NAD, well-groomed, well-developed  HEAD:  Atraumatic, Normocephalic  EYES: EOMI, PERRLA, conjunctiva and sclera clear  ENMT: No tonsillar erythema, exudates, or enlargement; Moist mucous membranes, Good dentition, No lesions  NECK: Supple, No JVD, Normal thyroid  NERVOUS SYSTEM:  Alert & Oriented X3, Good concentration; Motor Strength 5/5 B/L upper and lower extremities; DTRs 2+ intact and symmetric  CHEST/LUNG: Clear to percussion bilaterally; No rales, rhonchi, wheezing, or rubs  HEART: Regular rate and rhythm; No murmurs, rubs, or gallops  ABDOMEN: Soft, Nontender, Nondistended; Bowel sounds present  EXTREMITIES:  2+ Peripheral Pulses, No clubbing, cyanosis, or edema  LYMPH: No lymphadenopathy noted  SKIN: No rashes or lesions    LABS:                        15.3   4.29  )-----------( 173      ( 09 Dec 2023 11:11 )             44.7     12-09    141  |  105  |  17  ----------------------------<  118<H>  4.3   |  26  |  0.69    Ca    9.2      09 Dec 2023 11:11  Phos  2.3     12-09  Mg     2.2     12-09    TPro  6.7  /  Alb  3.7  /  TBili  0.4  /  DBili  x   /  AST  33  /  ALT  20  /  AlkPhos  72  12-09      Urinalysis Basic - ( 09 Dec 2023 11:11 )    Color: x / Appearance: x / SG: x / pH: x  Gluc: 118 mg/dL / Ketone: x  / Bili: x / Urobili: x   Blood: x / Protein: x / Nitrite: x   Leuk Esterase: x / RBC: x / WBC x   Sq Epi: x / Non Sq Epi: x / Bacteria: x      CAPILLARY BLOOD GLUCOSE            Urinalysis Basic - ( 09 Dec 2023 11:11 )    Color: x / Appearance: x / SG: x / pH: x  Gluc: 118 mg/dL / Ketone: x  / Bili: x / Urobili: x   Blood: x / Protein: x / Nitrite: x   Leuk Esterase: x / RBC: x / WBC x   Sq Epi: x / Non Sq Epi: x / Bacteria: x        MEDICATIONS  (STANDING):  ARIPiprazole 10 milliGRAM(s) Oral daily  chlorhexidine 2% Cloths 1 Application(s) Topical daily  divalproex  milliGRAM(s) Oral two times a day  heparin   Injectable 5000 Unit(s) SubCutaneous every 12 hours  pantoprazole    Tablet 40 milliGRAM(s) Oral before breakfast    MEDICATIONS  (PRN):      Care Discussed with Consultants/Other Providers [ ] YES  [ ] NO
Patient is a 63y old  Male who presents with a chief complaint of recurrent fall/ difficulty ambulating (15 Dec 2023 14:19)      INTERVAL HPI/OVERNIGHT EVENTS:  T(C): 37.1 (12-16-23 @ 16:27), Max: 37.1 (12-16-23 @ 16:27)  HR: 84 (12-16-23 @ 16:27) (62 - 87)  BP: 122/78 (12-16-23 @ 16:27) (110/80 - 129/78)  RR: 20 (12-16-23 @ 16:27) (18 - 20)  SpO2: 98% (12-16-23 @ 16:27) (94% - 98%)  Wt(kg): --  I&O's Summary    15 Dec 2023 07:01  -  16 Dec 2023 07:00  --------------------------------------------------------  IN: 1080 mL / OUT: 0 mL / NET: 1080 mL        PAST MEDICAL & SURGICAL HISTORY:  MR (mental retardation)      H/O cerebral palsy      Seizure      Diverticulosis      No significant past surgical history          SOCIAL HISTORY  Alcohol:  Tobacco:  Illicit substance use:    FAMILY HISTORY:    REVIEW OF SYSTEMS:  CONSTITUTIONAL: No fever, weight loss, or fatigue  EYES: No eye pain, visual disturbances, or discharge  ENMT:  No difficulty hearing, tinnitus, vertigo; No sinus or throat pain  NECK: No pain or stiffness  RESPIRATORY: No cough, wheezing, chills or hemoptysis; No shortness of breath  CARDIOVASCULAR: No chest pain, palpitations, dizziness, or leg swelling  GASTROINTESTINAL: No abdominal or epigastric pain. No nausea, vomiting, or hematemesis; No diarrhea or constipation. No melena or hematochezia.  GENITOURINARY: No dysuria, frequency, hematuria, or incontinence  NEUROLOGICAL: No headaches, memory loss, loss of strength, numbness, or tremors  SKIN: No itching, burning, rashes, or lesions   LYMPH NODES: No enlarged glands  ENDOCRINE: No heat or cold intolerance; No hair loss  MUSCULOSKELETAL: No joint pain or swelling; No muscle, back, or extremity pain  PSYCHIATRIC: No depression, anxiety, mood swings, or difficulty sleeping  HEME/LYMPH: No easy bruising, or bleeding gums  ALLERY AND IMMUNOLOGIC: No hives or eczema    RADIOLOGY & ADDITIONAL TESTS:    Imaging Personally Reviewed:  [ ] YES  [ ] NO    Consultant(s) Notes Reviewed:  [ ] YES  [ ] NO    PHYSICAL EXAM:  GENERAL: NAD, well-groomed, well-developed  HEAD:  Atraumatic, Normocephalic  EYES: EOMI, PERRLA, conjunctiva and sclera clear  ENMT: No tonsillar erythema, exudates, or enlargement; Moist mucous membranes, Good dentition, No lesions  NECK: Supple, No JVD, Normal thyroid  NERVOUS SYSTEM:  Alert & Oriented X3, Good concentration; Motor Strength 5/5 B/L upper and lower extremities; DTRs 2+ intact and symmetric  CHEST/LUNG: Clear to percussion bilaterally; No rales, rhonchi, wheezing, or rubs  HEART: Regular rate and rhythm; No murmurs, rubs, or gallops  ABDOMEN: Soft, Nontender, Nondistended; Bowel sounds present  EXTREMITIES:  2+ Peripheral Pulses, No clubbing, cyanosis, or edema  LYMPH: No lymphadenopathy noted  SKIN: No rashes or lesions    LABS:              CAPILLARY BLOOD GLUCOSE                MEDICATIONS  (STANDING):  ARIPiprazole 10 milliGRAM(s) Oral daily  chlorhexidine 2% Cloths 1 Application(s) Topical daily  divalproex  milliGRAM(s) Oral two times a day  heparin   Injectable 5000 Unit(s) SubCutaneous every 12 hours  pantoprazole    Tablet 40 milliGRAM(s) Oral before breakfast    MEDICATIONS  (PRN):      Care Discussed with Consultants/Other Providers [ ] YES  [ ] NO
Patient is a 63y old  Male who presents with a chief complaint of recurrent fall/ difficulty ambulating (12 Dec 2023 18:42)      INTERVAL HPI/OVERNIGHT EVENTS: seen and examined, NAD   T(C): 37.4 (12-13-23 @ 20:10), Max: 37.4 (12-13-23 @ 20:10)  HR: 76 (12-13-23 @ 20:10) (70 - 76)  BP: 104/70 (12-13-23 @ 20:10) (104/70 - 115/77)  RR: 18 (12-13-23 @ 20:10) (18 - 18)  SpO2: 95% (12-13-23 @ 20:10) (94% - 97%)  Wt(kg): --  I&O's Summary    12 Dec 2023 07:01  -  13 Dec 2023 07:00  --------------------------------------------------------  IN: 720 mL / OUT: 460 mL / NET: 260 mL    13 Dec 2023 07:01  -  13 Dec 2023 22:32  --------------------------------------------------------  IN: 940 mL / OUT: 300 mL / NET: 640 mL        PAST MEDICAL & SURGICAL HISTORY:  MR (mental retardation)      H/O cerebral palsy      Seizure      Diverticulosis      No significant past surgical history          SOCIAL HISTORY  Alcohol:  Tobacco:  Illicit substance use:    FAMILY HISTORY:    REVIEW OF SYSTEMS:  CONSTITUTIONAL: No fever, weight loss, or fatigue  EYES: No eye pain, visual disturbances, or discharge  ENMT:  No difficulty hearing, tinnitus, vertigo; No sinus or throat pain  NECK: No pain or stiffness  RESPIRATORY: No cough, wheezing, chills or hemoptysis; No shortness of breath  CARDIOVASCULAR: No chest pain, palpitations, dizziness, or leg swelling  GASTROINTESTINAL: No abdominal or epigastric pain. No nausea, vomiting, or hematemesis; No diarrhea or constipation. No melena or hematochezia.  GENITOURINARY: No dysuria, frequency, hematuria, or incontinence  NEUROLOGICAL: No headaches, memory loss, loss of strength, numbness, or tremors  SKIN: No itching, burning, rashes, or lesions   LYMPH NODES: No enlarged glands  ENDOCRINE: No heat or cold intolerance; No hair loss  MUSCULOSKELETAL: No joint pain or swelling; No muscle, back, or extremity pain  PSYCHIATRIC: No depression, anxiety, mood swings, or difficulty sleeping  HEME/LYMPH: No easy bruising, or bleeding gums  ALLERY AND IMMUNOLOGIC: No hives or eczema    RADIOLOGY & ADDITIONAL TESTS:    Imaging Personally Reviewed:  [ ] YES  [ ] NO    Consultant(s) Notes Reviewed:  [ ] YES  [ ] NO    PHYSICAL EXAM:  GENERAL: NAD, well-groomed, well-developed  HEAD:  Atraumatic, Normocephalic  EYES: EOMI, PERRLA, conjunctiva and sclera clear  ENMT: No tonsillar erythema, exudates, or enlargement; Moist mucous membranes, Good dentition, No lesions  NECK: Supple, No JVD, Normal thyroid  NERVOUS SYSTEM:  Alert & Oriented X3, Good concentration; Motor Strength 5/5 B/L upper and lower extremities; DTRs 2+ intact and symmetric  CHEST/LUNG: Clear to percussion bilaterally; No rales, rhonchi, wheezing, or rubs  HEART: Regular rate and rhythm; No murmurs, rubs, or gallops  ABDOMEN: Soft, Nontender, Nondistended; Bowel sounds present  EXTREMITIES:  2+ Peripheral Pulses, No clubbing, cyanosis, or edema  LYMPH: No lymphadenopathy noted  SKIN: No rashes or lesions    LABS:              CAPILLARY BLOOD GLUCOSE                MEDICATIONS  (STANDING):  ARIPiprazole 10 milliGRAM(s) Oral daily  chlorhexidine 2% Cloths 1 Application(s) Topical daily  divalproex  milliGRAM(s) Oral two times a day  heparin   Injectable 5000 Unit(s) SubCutaneous every 12 hours  pantoprazole    Tablet 40 milliGRAM(s) Oral before breakfast    MEDICATIONS  (PRN):      Care Discussed with Consultants/Other Providers [ ] YES  [ ] NO
Patient is a 63y old  Male who presents with a chief complaint of recurrent fall/ difficulty ambulating (15 Dec 2023 14:19)      INTERVAL HPI/OVERNIGHT EVENTS:    T(C): 36.6 (12-17-23 @ 20:15), Max: 36.6 (12-17-23 @ 20:15)  HR: 98 (12-17-23 @ 20:15) (98 - 98)  BP: 128/74 (12-17-23 @ 20:15) (128/74 - 128/74)  RR: 20 (12-17-23 @ 20:15) (20 - 20)  SpO2: 98% (12-17-23 @ 20:15) (98% - 98%)      MEDICATIONS  (STANDING):  ARIPiprazole 10 milliGRAM(s) Oral daily  chlorhexidine 2% Cloths 1 Application(s) Topical daily  divalproex  milliGRAM(s) Oral two times a day  heparin   Injectable 5000 Unit(s) SubCutaneous every 12 hours  pantoprazole    Tablet 40 milliGRAM(s) Oral before breakfast    MEDICATIONS  (PRN):  No significant past surgical history          SOCIAL HISTORY  Alcohol:  Tobacco:  Illicit substance use:    FAMILY HISTORY:    REVIEW OF SYSTEMS:  CONSTITUTIONAL: No fever, weight loss, or fatigue  EYES: No eye pain, visual disturbances, or discharge  ENMT:  No difficulty hearing, tinnitus, vertigo; No sinus or throat pain  NECK: No pain or stiffness  RESPIRATORY: No cough, wheezing, chills or hemoptysis; No shortness of breath  CARDIOVASCULAR: No chest pain, palpitations, dizziness, or leg swelling  GASTROINTESTINAL: No abdominal or epigastric pain. No nausea, vomiting, or hematemesis; No diarrhea or constipation. No melena or hematochezia.  GENITOURINARY: No dysuria, frequency, hematuria, or incontinence  NEUROLOGICAL: No headaches, memory loss, loss of strength, numbness, or tremors  SKIN: No itching, burning, rashes, or lesions   LYMPH NODES: No enlarged glands  ENDOCRINE: No heat or cold intolerance; No hair loss  MUSCULOSKELETAL: No joint pain or swelling; No muscle, back, or extremity pain  PSYCHIATRIC: No depression, anxiety, mood swings, or difficulty sleeping  HEME/LYMPH: No easy bruising, or bleeding gums  ALLERY AND IMMUNOLOGIC: No hives or eczema    RADIOLOGY & ADDITIONAL TESTS:    Imaging Personally Reviewed:  [ ] YES  [ ] NO    Consultant(s) Notes Reviewed:  [ ] YES  [ ] NO    PHYSICAL EXAM:  GENERAL: NAD, well-groomed, well-developed  HEAD:  Atraumatic, Normocephalic  EYES: EOMI, PERRLA, conjunctiva and sclera clear  ENMT: No tonsillar erythema, exudates, or enlargement; Moist mucous membranes, Good dentition, No lesions  NECK: Supple, No JVD, Normal thyroid  NERVOUS SYSTEM:  Alert & Oriented X3, Good concentration; Motor Strength 5/5 B/L upper and lower extremities; DTRs 2+ intact and symmetric  CHEST/LUNG: Clear to percussion bilaterally; No rales, rhonchi, wheezing, or rubs  HEART: Regular rate and rhythm; No murmurs, rubs, or gallops  ABDOMEN: Soft, Nontender, Nondistended; Bowel sounds present  EXTREMITIES:  2+ Peripheral Pulses, No clubbing, cyanosis, or edema  LYMPH: No lymphadenopathy noted  SKIN: No rashes or lesions    LABS:              CAPILLARY BLOOD GLUCOSE                MEDICATIONS  (STANDING):  ARIPiprazole 10 milliGRAM(s) Oral daily  chlorhexidine 2% Cloths 1 Application(s) Topical daily  divalproex  milliGRAM(s) Oral two times a day  heparin   Injectable 5000 Unit(s) SubCutaneous every 12 hours  pantoprazole    Tablet 40 milliGRAM(s) Oral before breakfast    MEDICATIONS  (PRN):      Care Discussed with Consultants/Other Providers [ ] YES  [ ] NO
Patient is a 63y old  Male who presents with a chief complaint of recurrent fall/ difficulty ambulating (10 Dec 2023 15:26)      INTERVAL HPI/OVERNIGHT EVENTS: seen and examined, NAD   T(C): 36.9 (12-11-23 @ 21:27), Max: 37.2 (12-11-23 @ 11:02)  HR: 69 (12-11-23 @ 21:27) (69 - 78)  BP: 104/68 (12-11-23 @ 21:27) (104/68 - 115/70)  RR: 18 (12-11-23 @ 21:27) (16 - 18)  SpO2: 98% (12-11-23 @ 21:27) (98% - 98%)  Wt(kg): --  I&O's Summary    10 Dec 2023 07:01  -  11 Dec 2023 07:00  --------------------------------------------------------  IN: 1200 mL / OUT: 0 mL / NET: 1200 mL    11 Dec 2023 07:01  -  11 Dec 2023 23:08  --------------------------------------------------------  IN: 360 mL / OUT: 280 mL / NET: 80 mL        PAST MEDICAL & SURGICAL HISTORY:  MR (mental retardation)      H/O cerebral palsy      Seizure      Diverticulosis      No significant past surgical history          SOCIAL HISTORY  Alcohol:  Tobacco:  Illicit substance use:    FAMILY HISTORY:    REVIEW OF SYSTEMS:  CONSTITUTIONAL: No fever, weight loss, or fatigue  EYES: No eye pain, visual disturbances, or discharge  ENMT:  No difficulty hearing, tinnitus, vertigo; No sinus or throat pain  NECK: No pain or stiffness  RESPIRATORY: No cough, wheezing, chills or hemoptysis; No shortness of breath  CARDIOVASCULAR: No chest pain, palpitations, dizziness, or leg swelling  GASTROINTESTINAL: No abdominal or epigastric pain. No nausea, vomiting, or hematemesis; No diarrhea or constipation. No melena or hematochezia.  GENITOURINARY: No dysuria, frequency, hematuria, or incontinence  NEUROLOGICAL: No headaches, memory loss, loss of strength, numbness, or tremors  SKIN: No itching, burning, rashes, or lesions   LYMPH NODES: No enlarged glands  ENDOCRINE: No heat or cold intolerance; No hair loss  MUSCULOSKELETAL: No joint pain or swelling; No muscle, back, or extremity pain  PSYCHIATRIC: No depression, anxiety, mood swings, or difficulty sleeping  HEME/LYMPH: No easy bruising, or bleeding gums  ALLERY AND IMMUNOLOGIC: No hives or eczema    RADIOLOGY & ADDITIONAL TESTS:    Imaging Personally Reviewed:  [ ] YES  [ ] NO    Consultant(s) Notes Reviewed:  [ ] YES  [ ] NO    PHYSICAL EXAM:  GENERAL: NAD, well-groomed, well-developed  HEAD:  Atraumatic, Normocephalic  EYES: EOMI, PERRLA, conjunctiva and sclera clear  ENMT: No tonsillar erythema, exudates, or enlargement; Moist mucous membranes, Good dentition, No lesions  NECK: Supple, No JVD, Normal thyroid  NERVOUS SYSTEM:  Alert & Oriented X3, Good concentration; Motor Strength 5/5 B/L upper and lower extremities; DTRs 2+ intact and symmetric  CHEST/LUNG: Clear to percussion bilaterally; No rales, rhonchi, wheezing, or rubs  HEART: Regular rate and rhythm; No murmurs, rubs, or gallops  ABDOMEN: Soft, Nontender, Nondistended; Bowel sounds present  EXTREMITIES:  2+ Peripheral Pulses, No clubbing, cyanosis, or edema  LYMPH: No lymphadenopathy noted  SKIN: No rashes or lesions    LABS:              CAPILLARY BLOOD GLUCOSE                MEDICATIONS  (STANDING):  ARIPiprazole 10 milliGRAM(s) Oral daily  chlorhexidine 2% Cloths 1 Application(s) Topical daily  divalproex  milliGRAM(s) Oral two times a day  heparin   Injectable 5000 Unit(s) SubCutaneous every 12 hours  pantoprazole    Tablet 40 milliGRAM(s) Oral before breakfast  potassium phosphate / sodium phosphate Powder (PHOS-NaK) 2 Packet(s) Oral every 4 hours    MEDICATIONS  (PRN):      Care Discussed with Consultants/Other Providers [ ] YES  [ ] NO
Patient is a 63y old  Male who presents with a chief complaint of recurrent fall/ difficulty ambulating (14 Dec 2023 16:55)      INTERVAL HPI/OVERNIGHT EVENTS: NAD   T(C): 36.9 (12-15-23 @ 21:07), Max: 37.1 (12-15-23 @ 05:15)  HR: 62 (12-15-23 @ 21:07) (62 - 72)  BP: 129/78 (12-15-23 @ 21:07) (112/75 - 129/78)  RR: 18 (12-15-23 @ 21:07) (18 - 18)  SpO2: 96% (12-15-23 @ 21:07) (95% - 96%)  Wt(kg): --  I&O's Summary    14 Dec 2023 07:01  -  15 Dec 2023 07:00  --------------------------------------------------------  IN: 600 mL / OUT: 300 mL / NET: 300 mL    15 Dec 2023 07:01  -  15 Dec 2023 23:19  --------------------------------------------------------  IN: 600 mL / OUT: 0 mL / NET: 600 mL        PAST MEDICAL & SURGICAL HISTORY:  MR (mental retardation)      H/O cerebral palsy      Seizure      Diverticulosis      No significant past surgical history          SOCIAL HISTORY  Alcohol:  Tobacco:  Illicit substance use:    FAMILY HISTORY:    REVIEW OF SYSTEMS:  CONSTITUTIONAL: No fever, weight loss, or fatigue  EYES: No eye pain, visual disturbances, or discharge  ENMT:  No difficulty hearing, tinnitus, vertigo; No sinus or throat pain  NECK: No pain or stiffness  RESPIRATORY: No cough, wheezing, chills or hemoptysis; No shortness of breath  CARDIOVASCULAR: No chest pain, palpitations, dizziness, or leg swelling  GASTROINTESTINAL: No abdominal or epigastric pain. No nausea, vomiting, or hematemesis; No diarrhea or constipation. No melena or hematochezia.  GENITOURINARY: No dysuria, frequency, hematuria, or incontinence  NEUROLOGICAL: No headaches, memory loss, loss of strength, numbness, or tremors  SKIN: No itching, burning, rashes, or lesions   LYMPH NODES: No enlarged glands  ENDOCRINE: No heat or cold intolerance; No hair loss  MUSCULOSKELETAL: No joint pain or swelling; No muscle, back, or extremity pain  PSYCHIATRIC: No depression, anxiety, mood swings, or difficulty sleeping  HEME/LYMPH: No easy bruising, or bleeding gums  ALLERY AND IMMUNOLOGIC: No hives or eczema    RADIOLOGY & ADDITIONAL TESTS:    Imaging Personally Reviewed:  [ ] YES  [ ] NO    Consultant(s) Notes Reviewed:  [ ] YES  [ ] NO    PHYSICAL EXAM:  GENERAL: NAD, well-groomed, well-developed  HEAD:  Atraumatic, Normocephalic  EYES: EOMI, PERRLA, conjunctiva and sclera clear  ENMT: No tonsillar erythema, exudates, or enlargement; Moist mucous membranes, Good dentition, No lesions  NECK: Supple, No JVD, Normal thyroid  NERVOUS SYSTEM:  Alert & Oriented X3, Good concentration; Motor Strength 5/5 B/L upper and lower extremities; DTRs 2+ intact and symmetric  CHEST/LUNG: Clear to percussion bilaterally; No rales, rhonchi, wheezing, or rubs  HEART: Regular rate and rhythm; No murmurs, rubs, or gallops  ABDOMEN: Soft, Nontender, Nondistended; Bowel sounds present  EXTREMITIES:  2+ Peripheral Pulses, No clubbing, cyanosis, or edema  LYMPH: No lymphadenopathy noted  SKIN: No rashes or lesions    LABS:              CAPILLARY BLOOD GLUCOSE                MEDICATIONS  (STANDING):  ARIPiprazole 10 milliGRAM(s) Oral daily  chlorhexidine 2% Cloths 1 Application(s) Topical daily  divalproex  milliGRAM(s) Oral two times a day  heparin   Injectable 5000 Unit(s) SubCutaneous every 12 hours  pantoprazole    Tablet 40 milliGRAM(s) Oral before breakfast    MEDICATIONS  (PRN):      Care Discussed with Consultants/Other Providers [ ] YES  [ ] NO
Patient is a 63y old  Male who presents with a chief complaint of recurrent fall/ difficulty ambulating (11 Dec 2023 19:08)      INTERVAL HPI/OVERNIGHT EVENTS: seen and examined, NAD   T(C): 37 (12-12-23 @ 20:45), Max: 37 (12-12-23 @ 20:45)  HR: 75 (12-12-23 @ 20:45) (72 - 83)  BP: 104/66 (12-12-23 @ 20:45) (101/68 - 109/73)  RR: 18 (12-12-23 @ 20:45) (16 - 18)  SpO2: 96% (12-12-23 @ 20:45) (94% - 97%)  Wt(kg): --  I&O's Summary    11 Dec 2023 07:01  -  12 Dec 2023 07:00  --------------------------------------------------------  IN: 840 mL / OUT: 280 mL / NET: 560 mL    12 Dec 2023 07:01  -  12 Dec 2023 21:42  --------------------------------------------------------  IN: 720 mL / OUT: 460 mL / NET: 260 mL        PAST MEDICAL & SURGICAL HISTORY:  MR (mental retardation)      H/O cerebral palsy      Seizure      Diverticulosis      No significant past surgical history          SOCIAL HISTORY  Alcohol:  Tobacco:  Illicit substance use:    FAMILY HISTORY:    REVIEW OF SYSTEMS:  CONSTITUTIONAL: No fever, weight loss, or fatigue  EYES: No eye pain, visual disturbances, or discharge  ENMT:  No difficulty hearing, tinnitus, vertigo; No sinus or throat pain  NECK: No pain or stiffness  RESPIRATORY: No cough, wheezing, chills or hemoptysis; No shortness of breath  CARDIOVASCULAR: No chest pain, palpitations, dizziness, or leg swelling  GASTROINTESTINAL: No abdominal or epigastric pain. No nausea, vomiting, or hematemesis; No diarrhea or constipation. No melena or hematochezia.  GENITOURINARY: No dysuria, frequency, hematuria, or incontinence  NEUROLOGICAL: No headaches, memory loss, loss of strength, numbness, or tremors  SKIN: No itching, burning, rashes, or lesions   LYMPH NODES: No enlarged glands  ENDOCRINE: No heat or cold intolerance; No hair loss  MUSCULOSKELETAL: No joint pain or swelling; No muscle, back, or extremity pain  PSYCHIATRIC: No depression, anxiety, mood swings, or difficulty sleeping  HEME/LYMPH: No easy bruising, or bleeding gums  ALLERY AND IMMUNOLOGIC: No hives or eczema    RADIOLOGY & ADDITIONAL TESTS:    Imaging Personally Reviewed:  [ ] YES  [ ] NO    Consultant(s) Notes Reviewed:  [ ] YES  [ ] NO    PHYSICAL EXAM:  GENERAL: NAD, well-groomed, well-developed  HEAD:  Atraumatic, Normocephalic  EYES: EOMI, PERRLA, conjunctiva and sclera clear  ENMT: No tonsillar erythema, exudates, or enlargement; Moist mucous membranes, Good dentition, No lesions  NECK: Supple, No JVD, Normal thyroid  NERVOUS SYSTEM:  Alert & Oriented X3, Good concentration; Motor Strength 5/5 B/L upper and lower extremities; DTRs 2+ intact and symmetric  CHEST/LUNG: Clear to percussion bilaterally; No rales, rhonchi, wheezing, or rubs  HEART: Regular rate and rhythm; No murmurs, rubs, or gallops  ABDOMEN: Soft, Nontender, Nondistended; Bowel sounds present  EXTREMITIES:  2+ Peripheral Pulses, No clubbing, cyanosis, or edema  LYMPH: No lymphadenopathy noted  SKIN: No rashes or lesions    LABS:              CAPILLARY BLOOD GLUCOSE                MEDICATIONS  (STANDING):  ARIPiprazole 10 milliGRAM(s) Oral daily  chlorhexidine 2% Cloths 1 Application(s) Topical daily  divalproex  milliGRAM(s) Oral two times a day  heparin   Injectable 5000 Unit(s) SubCutaneous every 12 hours  pantoprazole    Tablet 40 milliGRAM(s) Oral before breakfast    MEDICATIONS  (PRN):      Care Discussed with Consultants/Other Providers [ ] YES  [ ] NO
Patient is a 63y old  Male who presents with a chief complaint of recurrent fall/ difficulty ambulating (13 Dec 2023 15:32)      INTERVAL HPI/OVERNIGHT EVENTS: doing fair , awaiting STR placement   no resp distress   T(C): 37.1 (12-14-23 @ 21:02), Max: 37.1 (12-14-23 @ 21:02)  HR: 78 (12-14-23 @ 21:02) (74 - 94)  BP: 104/70 (12-14-23 @ 21:02) (104/70 - 132/75)  RR: 20 (12-14-23 @ 21:02) (18 - 20)  SpO2: 96% (12-14-23 @ 21:02) (94% - 97%)  Wt(kg): --  I&O's Summary    13 Dec 2023 07:01  -  14 Dec 2023 07:00  --------------------------------------------------------  IN: 1300 mL / OUT: 1050 mL / NET: 250 mL    14 Dec 2023 07:01  -  14 Dec 2023 23:55  --------------------------------------------------------  IN: 600 mL / OUT: 0 mL / NET: 600 mL        PAST MEDICAL & SURGICAL HISTORY:  MR (mental retardation)      H/O cerebral palsy      Seizure      Diverticulosis      No significant past surgical history          SOCIAL HISTORY  Alcohol:  Tobacco:  Illicit substance use:    FAMILY HISTORY:    REVIEW OF SYSTEMS:  CONSTITUTIONAL: No fever, weight loss, or fatigue  EYES: No eye pain, visual disturbances, or discharge  ENMT:  No difficulty hearing, tinnitus, vertigo; No sinus or throat pain  NECK: No pain or stiffness  RESPIRATORY: No cough, wheezing, chills or hemoptysis; No shortness of breath  CARDIOVASCULAR: No chest pain, palpitations, dizziness, or leg swelling  GASTROINTESTINAL: No abdominal or epigastric pain. No nausea, vomiting, or hematemesis; No diarrhea or constipation. No melena or hematochezia.  GENITOURINARY: No dysuria, frequency, hematuria, or incontinence  NEUROLOGICAL: No headaches, memory loss, loss of strength, numbness, or tremors  SKIN: No itching, burning, rashes, or lesions   LYMPH NODES: No enlarged glands  ENDOCRINE: No heat or cold intolerance; No hair loss  MUSCULOSKELETAL: No joint pain or swelling; No muscle, back, or extremity pain  PSYCHIATRIC: No depression, anxiety, mood swings, or difficulty sleeping  HEME/LYMPH: No easy bruising, or bleeding gums  ALLERY AND IMMUNOLOGIC: No hives or eczema    RADIOLOGY & ADDITIONAL TESTS:    Imaging Personally Reviewed:  [ ] YES  [ ] NO    Consultant(s) Notes Reviewed:  [ ] YES  [ ] NO    PHYSICAL EXAM:  GENERAL: NAD, well-groomed, well-developed  HEAD:  Atraumatic, Normocephalic  EYES: EOMI, PERRLA, conjunctiva and sclera clear  ENMT: No tonsillar erythema, exudates, or enlargement; Moist mucous membranes, Good dentition, No lesions  NECK: Supple, No JVD, Normal thyroid  NERVOUS SYSTEM:  Alert & Oriented X3, Good concentration; Motor Strength 5/5 B/L upper and lower extremities; DTRs 2+ intact and symmetric  CHEST/LUNG: Clear to percussion bilaterally; No rales, rhonchi, wheezing, or rubs  HEART: Regular rate and rhythm; No murmurs, rubs, or gallops  ABDOMEN: Soft, Nontender, Nondistended; Bowel sounds present  EXTREMITIES:  2+ Peripheral Pulses, No clubbing, cyanosis, or edema  LYMPH: No lymphadenopathy noted  SKIN: No rashes or lesions    LABS:              CAPILLARY BLOOD GLUCOSE                MEDICATIONS  (STANDING):  ARIPiprazole 10 milliGRAM(s) Oral daily  chlorhexidine 2% Cloths 1 Application(s) Topical daily  divalproex  milliGRAM(s) Oral two times a day  heparin   Injectable 5000 Unit(s) SubCutaneous every 12 hours  pantoprazole    Tablet 40 milliGRAM(s) Oral before breakfast    MEDICATIONS  (PRN):      Care Discussed with Consultants/Other Providers [ ] YES  [ ] NO

## 2023-12-18 ENCOUNTER — TRANSCRIPTION ENCOUNTER (OUTPATIENT)
Age: 63
End: 2023-12-18

## 2023-12-18 VITALS
OXYGEN SATURATION: 98 % | SYSTOLIC BLOOD PRESSURE: 117 MMHG | RESPIRATION RATE: 18 BRPM | HEART RATE: 88 BPM | TEMPERATURE: 98 F | DIASTOLIC BLOOD PRESSURE: 77 MMHG

## 2023-12-18 PROCEDURE — 82435 ASSAY OF BLOOD CHLORIDE: CPT

## 2023-12-18 PROCEDURE — 83690 ASSAY OF LIPASE: CPT

## 2023-12-18 PROCEDURE — 36415 COLL VENOUS BLD VENIPUNCTURE: CPT

## 2023-12-18 PROCEDURE — 80164 ASSAY DIPROPYLACETIC ACD TOT: CPT

## 2023-12-18 PROCEDURE — 96372 THER/PROPH/DIAG INJ SC/IM: CPT

## 2023-12-18 PROCEDURE — 71045 X-RAY EXAM CHEST 1 VIEW: CPT

## 2023-12-18 PROCEDURE — 87086 URINE CULTURE/COLONY COUNT: CPT

## 2023-12-18 PROCEDURE — 81003 URINALYSIS AUTO W/O SCOPE: CPT

## 2023-12-18 PROCEDURE — 82330 ASSAY OF CALCIUM: CPT

## 2023-12-18 PROCEDURE — 84100 ASSAY OF PHOSPHORUS: CPT

## 2023-12-18 PROCEDURE — 99285 EMERGENCY DEPT VISIT HI MDM: CPT

## 2023-12-18 PROCEDURE — 97530 THERAPEUTIC ACTIVITIES: CPT

## 2023-12-18 PROCEDURE — 93005 ELECTROCARDIOGRAM TRACING: CPT

## 2023-12-18 PROCEDURE — 84132 ASSAY OF SERUM POTASSIUM: CPT

## 2023-12-18 PROCEDURE — 97116 GAIT TRAINING THERAPY: CPT

## 2023-12-18 PROCEDURE — 85014 HEMATOCRIT: CPT

## 2023-12-18 PROCEDURE — 83735 ASSAY OF MAGNESIUM: CPT

## 2023-12-18 PROCEDURE — 84484 ASSAY OF TROPONIN QUANT: CPT

## 2023-12-18 PROCEDURE — 86803 HEPATITIS C AB TEST: CPT

## 2023-12-18 PROCEDURE — 87640 STAPH A DNA AMP PROBE: CPT

## 2023-12-18 PROCEDURE — 87641 MR-STAPH DNA AMP PROBE: CPT

## 2023-12-18 PROCEDURE — 82550 ASSAY OF CK (CPK): CPT

## 2023-12-18 PROCEDURE — 85025 COMPLETE CBC W/AUTO DIFF WBC: CPT

## 2023-12-18 PROCEDURE — 83605 ASSAY OF LACTIC ACID: CPT

## 2023-12-18 PROCEDURE — 82803 BLOOD GASES ANY COMBINATION: CPT

## 2023-12-18 PROCEDURE — 82947 ASSAY GLUCOSE BLOOD QUANT: CPT

## 2023-12-18 PROCEDURE — 80053 COMPREHEN METABOLIC PANEL: CPT

## 2023-12-18 PROCEDURE — 97162 PT EVAL MOD COMPLEX 30 MIN: CPT

## 2023-12-18 PROCEDURE — 85018 HEMOGLOBIN: CPT

## 2023-12-18 PROCEDURE — 87637 SARSCOV2&INF A&B&RSV AMP PRB: CPT

## 2023-12-18 PROCEDURE — 84295 ASSAY OF SERUM SODIUM: CPT

## 2023-12-18 RX ORDER — SALICYLIC ACID 0.5 %
1 CLEANSER (GRAM) TOPICAL
Refills: 0 | DISCHARGE

## 2023-12-18 RX ORDER — PANTOPRAZOLE SODIUM 20 MG/1
1 TABLET, DELAYED RELEASE ORAL
Qty: 0 | Refills: 0 | DISCHARGE
Start: 2023-12-18

## 2023-12-18 RX ORDER — DIVALPROEX SODIUM 500 MG/1
2 TABLET, DELAYED RELEASE ORAL
Refills: 0 | DISCHARGE

## 2023-12-18 RX ADMIN — ARIPIPRAZOLE 10 MILLIGRAM(S): 15 TABLET ORAL at 11:40

## 2023-12-18 RX ADMIN — DIVALPROEX SODIUM 500 MILLIGRAM(S): 500 TABLET, DELAYED RELEASE ORAL at 05:50

## 2023-12-18 RX ADMIN — PANTOPRAZOLE SODIUM 40 MILLIGRAM(S): 20 TABLET, DELAYED RELEASE ORAL at 06:00

## 2023-12-18 RX ADMIN — CHLORHEXIDINE GLUCONATE 1 APPLICATION(S): 213 SOLUTION TOPICAL at 11:39

## 2023-12-18 RX ADMIN — HEPARIN SODIUM 5000 UNIT(S): 5000 INJECTION INTRAVENOUS; SUBCUTANEOUS at 05:50

## 2023-12-18 NOTE — DISCHARGE NOTE PROVIDER - CARE PROVIDER_API CALL
ROSA MARIA ANNA  [address] [place] [address]  Phone: [phone]  Fax: ()-  Follow Up Time:    ROSA MARIA ANNA  [address] [place] [address]  Phone: [phone]  Fax: ()-  Follow Up Time:

## 2023-12-18 NOTE — DISCHARGE NOTE NURSING/CASE MANAGEMENT/SOCIAL WORK - NSDPFAC_GEN_ALL_CORE
[facility] ([facility] [address] [place] [address] Phone: [phone]   [facility] ([facility] [address] [place] [address] Phone: [phone]

## 2023-12-18 NOTE — DISCHARGE NOTE PROVIDER - NSDCMRMEDTOKEN_GEN_ALL_CORE_FT
ARIPiprazole 10 mg oral tablet: 1 tab(s) orally once a day  cholecalciferol 25 mcg (1000 intl units) oral tablet: 1 tab(s) orally once a day  clotrimazole 1% topical cream: Apply topically to affected area 2 times a day  divalproex sodium 500 mg oral delayed release tablet: 2 tab(s) orally once a day (at bedtime)  divalproex sodium 500 mg oral delayed release tablet: 1 tab(s) orally 2 times a day  docusate sodium 100 mg oral capsule: 2 cap(s) orally once a day  Vitamin A and D topical ointment: Apply topically to affected area 2 times a day   ARIPiprazole 10 mg oral tablet: 1 tab(s) orally once a day  cholecalciferol 25 mcg (1000 intl units) oral tablet: 1 tab(s) orally once a day  divalproex sodium 500 mg oral delayed release tablet: 1 tab(s) orally 2 times a day  docusate sodium 100 mg oral capsule: 2 cap(s) orally once a day  pantoprazole 40 mg oral delayed release tablet: 1 tab(s) orally once a day (before a meal)

## 2023-12-18 NOTE — DIETITIAN INITIAL EVALUATION ADULT - PERTINENT MEDS FT
MEDICATIONS  (STANDING):  ARIPiprazole 10 milliGRAM(s) Oral daily  chlorhexidine 2% Cloths 1 Application(s) Topical daily  divalproex  milliGRAM(s) Oral two times a day  heparin   Injectable 5000 Unit(s) SubCutaneous every 12 hours  pantoprazole    Tablet 40 milliGRAM(s) Oral before breakfast    MEDICATIONS  (PRN):

## 2023-12-18 NOTE — DIETITIAN INITIAL EVALUATION ADULT - OTHER INFO
NKFA per patient, confirmed by chart. Patient reported being unsure of both his height and his bodyweight, both patient and aide deny any significant body weight changes PTA. Per Massiel ALMEIDA, recent weight history as follows: 160lbs (12/8/2023), 160lbs (9/16/2023), 154lbs (6/16/2020). Will monitor weight trend.    Hypophosphatemic on 12/9. Supplementation noted. Request updated level with next labs, replete as appropriate.

## 2023-12-18 NOTE — DIETITIAN INITIAL EVALUATION ADULT - REASON FOR ADMISSION
Unsteadiness on feet    Per chart, patient is a 62 y/o male with PMH of cerebral palsy. Patient presents to [facility] for falls/difficulty ambulating. COVID-19 positive upon admission. Unsteadiness on feet    Per chart, patient is a 62 y/o male with PMH of cerebral palsy. Patient presents to [facility] for falls/difficulty ambulating. COVID-19 positive upon admission.

## 2023-12-18 NOTE — DIETITIAN INITIAL EVALUATION ADULT - ORAL INTAKE PTA/DIET HISTORY
Patient and patient's aide report patient normally eating well PTA. Denied chewing/swallowing impairment or nausea/vomiting.

## 2023-12-18 NOTE — DIETITIAN INITIAL EVALUATION ADULT - PERSON TAUGHT/METHOD
adequate caloric/protein intake, food preferences/verbal instruction/teach back - (Patient repeats in own words)/patient instructed/caregiver

## 2023-12-18 NOTE — DIETITIAN INITIAL EVALUATION ADULT - ADD RECOMMEND
1. continue current diet as tolerated of: regular diet  2. encourage PO intake, protein source with each meal as tolerated  3. allow 2x protein sources with meals  4. provide assistance with meals for the patient as needed  5. when next labs are obtained, request updated phosphorus level to be checked, replete level as appropriate  6. if medically feasible, consider addition of MVI and vitamin C to help aid in wound healing  7. RD to add Mighty Shake BID for extra calories and protein  8. monitor PO intake, weight trend, electrolytes, blood glucose levels, labs, BMs, wound healing

## 2023-12-18 NOTE — DIETITIAN INITIAL EVALUATION ADULT - ENERGY INTAKE
Adequate (%) Patient reports eating well thus far during admission w/ no issues. States he will have an intermittent cough that is not related to PO intake and believes it is related to recently having COVID-19 during admission.

## 2023-12-18 NOTE — DISCHARGE NOTE PROVIDER - NSDCCPCAREPLAN_GEN_ALL_CORE_FT
PRINCIPAL DISCHARGE DIAGNOSIS  Diagnosis: Gait instability  Assessment and Plan of Treatment: Resolved.   You are sent to rehab to gain strength and stability.      SECONDARY DISCHARGE DIAGNOSES  Diagnosis: 2019 novel coronavirus disease (COVID-19)  Assessment and Plan of Treatment: You tested positive for COVID 19.  You no longer require hospitalization.  You did not need any intravenous therapy for COVID-19. You do not need any isolation.

## 2023-12-18 NOTE — DISCHARGE NOTE NURSING/CASE MANAGEMENT/SOCIAL WORK - NSDCPEFALRISK_GEN_ALL_CORE
For information on Fall & Injury Prevention, visit: [email or web address] OR  [email or web address] OR  https://www.cdc.gov/steadi/patient.html For information on Fall & Injury Prevention, visit: [email or web address]/[email or web address]-[email or web address]-mobility OR  [email or web address].[email or web address]/[email or web address]-prevention-tips-to-avoid-injury OR  https://www.cdc.gov/steadi/patient.html

## 2023-12-18 NOTE — DISCHARGE NOTE NURSING/CASE MANAGEMENT/SOCIAL WORK - PATIENT PORTAL LINK FT
You can access the FollowMyHealth Patient Portal offered by [facility] by registering at the following website: [email or web address]. By joining [facility]’s FollowMyHealth portal, you will also be able to view your health information using other applications (apps) compatible with our system. You can access the FollowMyHealth Patient Portal offered by [facility] by registering at the following website: [email or web address]. By joining [facility]’s FollowMyHealth portal, you will also be able to view your health information using other applications (apps) compatible with our system.

## 2024-01-17 NOTE — ED PROVIDER NOTE - PHYSICAL EXAMINATION
Last Office Visit 1/3/24  Next Office Visit 4/3/24  Last Labs 12//6/23  Last Refill   leflunomide (ARAVA) 20 MG tablet 90 tablet 0 10/23/2023 --    Sig - Route: TAKE 1 TABLET BY MOUTH DAILY - Oral    Sent to pharmacy as: Leflunomide 20 MG Oral Tablet (ARAVA)    Class: Eprescribe    E-Prescribing Status: Receipt confirmed by pharmacy (10/23/2023  7:54 AM CDT)      Please advise     
NCAT   Neck supple  Pt is moving all extremities, no gross deformities  Pelvis stable  Gait at baseline with walker

## 2024-03-15 ENCOUNTER — INPATIENT (INPATIENT)
Facility: HOSPITAL | Age: 64
LOS: 9 days | Discharge: NURSING, REHAB OR LONG-TERM CARE | DRG: 884 | End: 2024-03-25
Attending: INTERNAL MEDICINE | Admitting: STUDENT IN AN ORGANIZED HEALTH CARE EDUCATION/TRAINING PROGRAM
Payer: MEDICARE

## 2024-03-15 VITALS
TEMPERATURE: 98 F | HEART RATE: 81 BPM | SYSTOLIC BLOOD PRESSURE: 118 MMHG | RESPIRATION RATE: 18 BRPM | DIASTOLIC BLOOD PRESSURE: 84 MMHG | OXYGEN SATURATION: 97 %

## 2024-03-15 DIAGNOSIS — F31.9 BIPOLAR DISORDER, UNSPECIFIED: ICD-10-CM

## 2024-03-15 DIAGNOSIS — Z29.9 ENCOUNTER FOR PROPHYLACTIC MEASURES, UNSPECIFIED: ICD-10-CM

## 2024-03-15 DIAGNOSIS — G40.909 EPILEPSY, UNSPECIFIED, NOT INTRACTABLE, WITHOUT STATUS EPILEPTICUS: ICD-10-CM

## 2024-03-15 DIAGNOSIS — R53.1 WEAKNESS: ICD-10-CM

## 2024-03-15 LAB
ADD ON TEST-SPECIMEN IN LAB: SIGNIFICANT CHANGE UP
ALBUMIN SERPL ELPH-MCNC: 4 G/DL — SIGNIFICANT CHANGE UP (ref 3.3–5)
ALP SERPL-CCNC: 81 U/L — SIGNIFICANT CHANGE UP (ref 40–120)
ALT FLD-CCNC: 9 U/L — LOW (ref 10–45)
ANION GAP SERPL CALC-SCNC: 13 MMOL/L — SIGNIFICANT CHANGE UP (ref 5–17)
APPEARANCE UR: CLEAR — SIGNIFICANT CHANGE UP
AST SERPL-CCNC: 13 U/L — SIGNIFICANT CHANGE UP (ref 10–40)
BACTERIA # UR AUTO: NEGATIVE /HPF — SIGNIFICANT CHANGE UP
BASOPHILS # BLD AUTO: 0.03 K/UL — SIGNIFICANT CHANGE UP (ref 0–0.2)
BASOPHILS NFR BLD AUTO: 0.3 % — SIGNIFICANT CHANGE UP (ref 0–2)
BILIRUB SERPL-MCNC: 0.3 MG/DL — SIGNIFICANT CHANGE UP (ref 0.2–1.2)
BILIRUB UR-MCNC: NEGATIVE — SIGNIFICANT CHANGE UP
BUN SERPL-MCNC: 26 MG/DL — HIGH (ref 7–23)
CALCIUM SERPL-MCNC: 9.5 MG/DL — SIGNIFICANT CHANGE UP (ref 8.4–10.5)
CHLORIDE SERPL-SCNC: 108 MMOL/L — SIGNIFICANT CHANGE UP (ref 96–108)
CO2 SERPL-SCNC: 22 MMOL/L — SIGNIFICANT CHANGE UP (ref 22–31)
COLOR SPEC: YELLOW — SIGNIFICANT CHANGE UP
CREAT SERPL-MCNC: 1.07 MG/DL — SIGNIFICANT CHANGE UP (ref 0.5–1.3)
DIFF PNL FLD: ABNORMAL
EGFR: 77 ML/MIN/1.73M2 — SIGNIFICANT CHANGE UP
EOSINOPHIL # BLD AUTO: 0 K/UL — SIGNIFICANT CHANGE UP (ref 0–0.5)
EOSINOPHIL NFR BLD AUTO: 0 % — SIGNIFICANT CHANGE UP (ref 0–6)
EPI CELLS # UR: 1 — SIGNIFICANT CHANGE UP
FLUAV AG NPH QL: SIGNIFICANT CHANGE UP
FLUBV AG NPH QL: SIGNIFICANT CHANGE UP
GLUCOSE SERPL-MCNC: 101 MG/DL — HIGH (ref 70–99)
GLUCOSE UR QL: NEGATIVE — SIGNIFICANT CHANGE UP
HCT VFR BLD CALC: 43.5 % — SIGNIFICANT CHANGE UP (ref 39–50)
HGB BLD-MCNC: 14.9 G/DL — SIGNIFICANT CHANGE UP (ref 13–17)
HYALINE CASTS # UR AUTO: 0 — SIGNIFICANT CHANGE UP
IMM GRANULOCYTES NFR BLD AUTO: 0.8 % — SIGNIFICANT CHANGE UP (ref 0–0.9)
KETONES UR-MCNC: ABNORMAL
LEUKOCYTE ESTERASE UR-ACNC: NEGATIVE — SIGNIFICANT CHANGE UP
LYMPHOCYTES # BLD AUTO: 1.41 K/UL — SIGNIFICANT CHANGE UP (ref 1–3.3)
LYMPHOCYTES # BLD AUTO: 14.1 % — SIGNIFICANT CHANGE UP (ref 13–44)
MAGNESIUM SERPL-MCNC: 2.2 MG/DL — SIGNIFICANT CHANGE UP (ref 1.6–2.6)
MCHC RBC-ENTMCNC: 31.1 PG — SIGNIFICANT CHANGE UP (ref 27–34)
MCHC RBC-ENTMCNC: 34.3 GM/DL — SIGNIFICANT CHANGE UP (ref 32–36)
MCV RBC AUTO: 90.8 FL — SIGNIFICANT CHANGE UP (ref 80–100)
MONOCYTES # BLD AUTO: 1 K/UL — HIGH (ref 0–0.9)
MONOCYTES NFR BLD AUTO: 10 % — SIGNIFICANT CHANGE UP (ref 2–14)
NEUTROPHILS # BLD AUTO: 7.46 K/UL — HIGH (ref 1.8–7.4)
NEUTROPHILS NFR BLD AUTO: 74.8 % — SIGNIFICANT CHANGE UP (ref 43–77)
NITRITE UR-MCNC: NEGATIVE — SIGNIFICANT CHANGE UP
NRBC # BLD: 0 /100 WBCS — SIGNIFICANT CHANGE UP (ref 0–0)
PH UR: 6 — SIGNIFICANT CHANGE UP (ref 5–8)
PHOSPHATE SERPL-MCNC: 2.4 MG/DL — LOW (ref 2.5–4.5)
PLATELET # BLD AUTO: 200 K/UL — SIGNIFICANT CHANGE UP (ref 150–400)
POTASSIUM SERPL-MCNC: 4.2 MMOL/L — SIGNIFICANT CHANGE UP (ref 3.5–5.3)
POTASSIUM SERPL-SCNC: 4.2 MMOL/L — SIGNIFICANT CHANGE UP (ref 3.5–5.3)
PROT SERPL-MCNC: 7 G/DL — SIGNIFICANT CHANGE UP (ref 6–8.3)
PROT UR-MCNC: NEGATIVE — SIGNIFICANT CHANGE UP
RBC # BLD: 4.79 M/UL — SIGNIFICANT CHANGE UP (ref 4.2–5.8)
RBC # FLD: 12 % — SIGNIFICANT CHANGE UP (ref 10.3–14.5)
RBC CASTS # UR COMP ASSIST: 1 /HPF — SIGNIFICANT CHANGE UP (ref 0–4)
RSV RNA NPH QL NAA+NON-PROBE: SIGNIFICANT CHANGE UP
SARS-COV-2 RNA SPEC QL NAA+PROBE: SIGNIFICANT CHANGE UP
SODIUM SERPL-SCNC: 143 MMOL/L — SIGNIFICANT CHANGE UP (ref 135–145)
SP GR SPEC: 1.02 — SIGNIFICANT CHANGE UP (ref 1.01–1.03)
UROBILINOGEN FLD QL: SIGNIFICANT CHANGE UP
VALPROATE SERPL-MCNC: 55 UG/ML — SIGNIFICANT CHANGE UP (ref 50–100)
WBC # BLD: 9.98 K/UL — SIGNIFICANT CHANGE UP (ref 3.8–10.5)
WBC # FLD AUTO: 9.98 K/UL — SIGNIFICANT CHANGE UP (ref 3.8–10.5)
WBC UR QL: 1 /HPF — SIGNIFICANT CHANGE UP (ref 0–5)

## 2024-03-15 PROCEDURE — 93010 ELECTROCARDIOGRAM REPORT: CPT

## 2024-03-15 PROCEDURE — 72131 CT LUMBAR SPINE W/O DYE: CPT | Mod: 26

## 2024-03-15 PROCEDURE — 99285 EMERGENCY DEPT VISIT HI MDM: CPT | Mod: GC

## 2024-03-15 PROCEDURE — 70450 CT HEAD/BRAIN W/O DYE: CPT | Mod: 26

## 2024-03-15 PROCEDURE — 72125 CT NECK SPINE W/O DYE: CPT | Mod: 26

## 2024-03-15 PROCEDURE — 99223 1ST HOSP IP/OBS HIGH 75: CPT

## 2024-03-15 PROCEDURE — 71045 X-RAY EXAM CHEST 1 VIEW: CPT | Mod: 26

## 2024-03-15 RX ORDER — DIVALPROEX SODIUM 500 MG/1
1 TABLET, DELAYED RELEASE ORAL
Refills: 0 | DISCHARGE

## 2024-03-15 RX ORDER — SIMVASTATIN 20 MG/1
10 TABLET, FILM COATED ORAL AT BEDTIME
Refills: 0 | Status: DISCONTINUED | OUTPATIENT
Start: 2024-03-15 | End: 2024-03-25

## 2024-03-15 RX ORDER — CHOLECALCIFEROL (VITAMIN D3) 125 MCG
1 CAPSULE ORAL
Refills: 0 | DISCHARGE

## 2024-03-15 RX ORDER — PANTOPRAZOLE SODIUM 20 MG/1
40 TABLET, DELAYED RELEASE ORAL
Refills: 0 | Status: DISCONTINUED | OUTPATIENT
Start: 2024-03-15 | End: 2024-03-25

## 2024-03-15 RX ORDER — ACETAMINOPHEN 500 MG
650 TABLET ORAL EVERY 6 HOURS
Refills: 0 | Status: DISCONTINUED | OUTPATIENT
Start: 2024-03-15 | End: 2024-03-25

## 2024-03-15 RX ORDER — CHOLECALCIFEROL (VITAMIN D3) 125 MCG
1000 CAPSULE ORAL DAILY
Refills: 0 | Status: DISCONTINUED | OUTPATIENT
Start: 2024-03-15 | End: 2024-03-25

## 2024-03-15 RX ORDER — LANOLIN ALCOHOL/MO/W.PET/CERES
3 CREAM (GRAM) TOPICAL AT BEDTIME
Refills: 0 | Status: DISCONTINUED | OUTPATIENT
Start: 2024-03-15 | End: 2024-03-25

## 2024-03-15 RX ORDER — ENOXAPARIN SODIUM 100 MG/ML
40 INJECTION SUBCUTANEOUS EVERY 24 HOURS
Refills: 0 | Status: DISCONTINUED | OUTPATIENT
Start: 2024-03-15 | End: 2024-03-25

## 2024-03-15 RX ORDER — DIVALPROEX SODIUM 500 MG/1
500 TABLET, DELAYED RELEASE ORAL
Refills: 0 | Status: DISCONTINUED | OUTPATIENT
Start: 2024-03-15 | End: 2024-03-25

## 2024-03-15 RX ORDER — ONDANSETRON 8 MG/1
4 TABLET, FILM COATED ORAL EVERY 8 HOURS
Refills: 0 | Status: DISCONTINUED | OUTPATIENT
Start: 2024-03-15 | End: 2024-03-25

## 2024-03-15 RX ORDER — ARIPIPRAZOLE 15 MG/1
10 TABLET ORAL DAILY
Refills: 0 | Status: DISCONTINUED | OUTPATIENT
Start: 2024-03-15 | End: 2024-03-25

## 2024-03-15 RX ORDER — SIMVASTATIN 20 MG/1
1 TABLET, FILM COATED ORAL
Refills: 0 | DISCHARGE

## 2024-03-15 RX ORDER — DOCUSATE SODIUM 100 MG
2 CAPSULE ORAL
Refills: 0 | DISCHARGE

## 2024-03-15 RX ORDER — SENNA PLUS 8.6 MG/1
2 TABLET ORAL AT BEDTIME
Refills: 0 | Status: DISCONTINUED | OUTPATIENT
Start: 2024-03-15 | End: 2024-03-25

## 2024-03-15 RX ORDER — ARIPIPRAZOLE 15 MG/1
1 TABLET ORAL
Refills: 0 | DISCHARGE

## 2024-03-15 NOTE — H&P ADULT - PROBLEM SELECTOR PLAN 4
- DVT ppx: Lovenox SQ   - GI ppx: PPI   - Diet: Regular, thin liquid   - PT consult     Dispo pending PT eval

## 2024-03-15 NOTE — ED ADULT NURSE NOTE - NSFALLRISKINTERV_ED_ALL_ED
Assistance OOB with selected safe patient handling equipment if applicable/Assistance with ambulation/Communicate fall risk and risk factors to all staff, patient, and family/Monitor gait and stability/Provide visual cue: yellow wristband, yellow gown, etc/Reinforce activity limits and safety measures with patient and family/Call bell, personal items and telephone in reach/Instruct patient to call for assistance before getting out of bed/chair/stretcher/Non-slip footwear applied when patient is off stretcher/[place] to call system/Physically safe environment - no spills, clutter or unnecessary equipment/Purposeful Proactive Rounding/Room/bathroom lighting operational, light cord in reach

## 2024-03-15 NOTE — ED PROVIDER NOTE - CLINICAL SUMMARY MEDICAL DECISION MAKING FREE TEXT BOX
Dr. Bryan Note: see attg statement below I will START or STAY ON the medications listed below when I get home from the hospital:  None

## 2024-03-15 NOTE — ED PROVIDER NOTE - IV ALTEPLASE DOOR HIDDEN
Nursing Note by Diamante Verma at 05/30/17 09:26 AM     Author:  Diamante Verma Service:  (none) Author Type:       Filed:  05/30/17 09:27 AM Encounter Date:  5/1/2017 Status:  Signed     :  Diamante Verma ()            refaxed per medical records staff.[AC1.1M]  Electronically Signed by:    Diamante Verma 5/30/2017[AC1.2T]        Revision History        User Key Date/Time User Provider Type Action    > AC1.2 05/30/17 09:27 AM Diamante Verma  Sign     AC1.1 05/30/17 09:26 AM Diamante Verma      M - Manual, T - Template             show

## 2024-03-15 NOTE — ED ADULT NURSE NOTE - OBJECTIVE STATEMENT
Patient BIBEMS from group home c/o weakness. Patient was dc from rehab today and is unable to walk. Patient was in rehab because he was covid positive and was unable to ambulate, on returning home from rehab patient still had difficulty ambulating. Denies pain in legs or any other complaints. Patient A&Ox4. Denies chest pain, sob, n/v/d. No signs of acute distress. Plan of care in progress.

## 2024-03-15 NOTE — ED ADULT NURSE REASSESSMENT NOTE - NS ED NURSE REASSESS COMMENT FT1
Report received from Asia MARADIAGA. Pt developmentally delayed and appears to be at baseline mental status, breathing unlabored on room air and speaking in complete sentences. Aide a t bedside. Safety and comfort measures maintained.

## 2024-03-15 NOTE — ED PROVIDER NOTE - ATTENDING CONTRIBUTION TO CARE
External records reviewed, per [facility] medical records patient was a resident discharged today from that rehab for weakness in the legs, is on Depakote 500 mg twice a day,    Abilify 10 mg for bipolar, vitamin D, Colace, omeprazole, simvastatin.  History obtained from patient and group home member at bedside, patient was discharged today from [place] rehab, usually uses a walker is very unsteady using that walker today has almost fallen several times requiring heavy assistance from group home members.  Patient is not able to stay at the group home due to full assist need.    Patient denies any obvious falls, however history limited due to intellectual disability, denies any pain, no reported fevers, vomiting, chest pain, shortness of breath, abdominal pain, diarrhea.    Physical exam: Patient well-appearing, nontoxic, no respiratory distress, no CT or L-spine tenderness, no obvious contusions or ecchymosis noted, clear lungs, regular rate and rhythm, abdomen soft nontender, 5 out of 5 motor upper extremity however patient has difficulty lifting either leg off the bed, drifts after a few seconds.  No peripheral edema.    MDM: Lower extremity weakness bilaterally, difficulty ambulating, worse than usual baseline, will obtain CT scan of the head rule out intracranial hemorrhage, CT of the cervical and lumbar spine rule out cervical and lumbar stenosis or occult fractures, will obtain labs including CBC rule out anemia, chemistries, CPK rule out myositis or rhabdo, patient will require inpatient care for ambulatory dysfunction, weakness, will need PT, and likely repeat rehab placement.

## 2024-03-15 NOTE — H&P ADULT - HISTORY OF PRESENT ILLNESS
63m pmh intellectual disability, seizure d/o, bipolar, gerd presents for eval of weakness. Pt was discharged today from [place] rehab and went back to his group home. Pt noted to be weak, normally ambulates w/a walker but is very unsteady and almost fell serval times today at the group home and required significant assistance from group home members.  Patient is high risk for fall given weakness and is unsafe to stay at group home given he requires full assistance so was sent to ED for eval of weakness and safe dispo planning.    ROS: Denies CP, SOB, palpitation, N/V/D, fever, cough, chills, dizziness, abm pain, recent travel, sick contact, change in bowel and urinary habits     A 10-system ROS was performed and is negative except as noted above and/or in the HPI.

## 2024-03-15 NOTE — H&P ADULT - ASSESSMENT
63m pmh intellectual disability, seizure d/o, bipolar, gerd presents from group home for eval of weakness and safe dispo planning

## 2024-03-15 NOTE — ED ADULT NURSE NOTE - NSICDXPASTMEDICALHX_GEN_ALL_CORE_FT
PAST MEDICAL HISTORY:  Cerebral palsy     Diverticulosis     H/O cerebral palsy     H/O gingivitis     MR (mental retardation)     Seizure

## 2024-03-15 NOTE — ED ADULT TRIAGE NOTE - BIRTH SEX
Caller: Ashwin Coleman    Relationship to patient: Self    Best call back number: [phone]    Chief complaint:     Type of visit: NEW PATIENT    Requested date: ON 4-18-23     If rescheduling, when is the original appointment: 4-15-24     Additional notes:PATIENTS PARTNER HAS AN APPOINTMENT ON 4-18-23 AND PATIENT WOULD LIKE TO KNOW IF THERE IS ANY WAY TO GET THEM BOTH ON SAME DAY? BOTH PATIENTS REFERRAL AUTHORIZATIONS ARE ABOUT TO RUN OUT. PLEASE REACH OUT TO ASHWIN COLEMAN TO DISCUSS.         
Male

## 2024-03-15 NOTE — H&P ADULT - NSHPPHYSICALEXAM_GEN_ALL_CORE
T(C): 37.2 (03-15-24 @ 19:25), Max: 37.2 (03-15-24 @ 19:25)  HR: 92 (03-15-24 @ 19:25) (81 - 92)  BP: 115/72 (03-15-24 @ 19:25) (115/72 - 118/84)  RR: 18 (03-15-24 @ 19:25) (18 - 18)  SpO2: 96% (03-15-24 @ 19:25) (96% - 97%)    CONSTITUTIONAL: Well groomed, no apparent distress  EYES: PERRLA and symmetric, EOMI  ENMT: MMM. Normal dentition  RESP: No respiratory distress, no use of accessory muscles; CTA b/l  CV: +S1S2, RRR, no MRG; no peripheral edema  GI: Soft, NTND, no RGR  MSK: No digital clubbing or cyanosis; normal pain free ROM x4 extremities,   SKIN: No rashes or ulcers noted  NEURO: Normal reflexes, sensation intact throughout, LE weakness   PSYCH: A+O x 2- 3, baseline intellectual disability

## 2024-03-15 NOTE — H&P ADULT - PROBLEM SELECTOR PLAN 1
Recent d/cd today from [place] rehab. Went back to his group home and noted to be very weak.  Ambulates w/ walker but is very unsteady, had several near fall episodes at the group home requiring full assistance  Patient is high risk for fall given weakness and is unsafe to stay at group home     - Review of labs and imaging unrevealing.  CK wnl, RVP/COVID neg.   - F/u UA/Ucx pending collection   - Likely component of debility in this frail older adult   - Fall precaution    - PT consult   - SW/ CM for safe dispo planning

## 2024-03-16 PROBLEM — Z87.19 PERSONAL HISTORY OF OTHER DISEASES OF THE DIGESTIVE SYSTEM: Chronic | Status: ACTIVE | Noted: 2022-08-28

## 2024-03-16 PROBLEM — G80.9 CEREBRAL PALSY, UNSPECIFIED: Chronic | Status: ACTIVE | Noted: 2019-11-21

## 2024-03-16 LAB
ANION GAP SERPL CALC-SCNC: 11 MMOL/L — SIGNIFICANT CHANGE UP (ref 5–17)
BUN SERPL-MCNC: 22 MG/DL — SIGNIFICANT CHANGE UP (ref 7–23)
CALCIUM SERPL-MCNC: 9.1 MG/DL — SIGNIFICANT CHANGE UP (ref 8.4–10.5)
CHLORIDE SERPL-SCNC: 108 MMOL/L — SIGNIFICANT CHANGE UP (ref 96–108)
CO2 SERPL-SCNC: 24 MMOL/L — SIGNIFICANT CHANGE UP (ref 22–31)
CREAT SERPL-MCNC: 0.67 MG/DL — SIGNIFICANT CHANGE UP (ref 0.5–1.3)
CULTURE RESULTS: SIGNIFICANT CHANGE UP
EGFR: 104 ML/MIN/1.73M2 — SIGNIFICANT CHANGE UP
GLUCOSE SERPL-MCNC: 135 MG/DL — HIGH (ref 70–99)
HCT VFR BLD CALC: 41.4 % — SIGNIFICANT CHANGE UP (ref 39–50)
HGB BLD-MCNC: 14.2 G/DL — SIGNIFICANT CHANGE UP (ref 13–17)
INR BLD: 1.08 RATIO — SIGNIFICANT CHANGE UP (ref 0.85–1.18)
MCHC RBC-ENTMCNC: 31.2 PG — SIGNIFICANT CHANGE UP (ref 27–34)
MCHC RBC-ENTMCNC: 34.3 GM/DL — SIGNIFICANT CHANGE UP (ref 32–36)
MCV RBC AUTO: 91 FL — SIGNIFICANT CHANGE UP (ref 80–100)
MRSA PCR RESULT.: SIGNIFICANT CHANGE UP
NRBC # BLD: 0 /100 WBCS — SIGNIFICANT CHANGE UP (ref 0–0)
PLATELET # BLD AUTO: 178 K/UL — SIGNIFICANT CHANGE UP (ref 150–400)
POTASSIUM SERPL-MCNC: 3.9 MMOL/L — SIGNIFICANT CHANGE UP (ref 3.5–5.3)
POTASSIUM SERPL-SCNC: 3.9 MMOL/L — SIGNIFICANT CHANGE UP (ref 3.5–5.3)
PROTHROM AB SERPL-ACNC: 11.3 SEC — SIGNIFICANT CHANGE UP (ref 9.5–13)
RBC # BLD: 4.55 M/UL — SIGNIFICANT CHANGE UP (ref 4.2–5.8)
RBC # FLD: 12.2 % — SIGNIFICANT CHANGE UP (ref 10.3–14.5)
S AUREUS DNA NOSE QL NAA+PROBE: SIGNIFICANT CHANGE UP
SODIUM SERPL-SCNC: 143 MMOL/L — SIGNIFICANT CHANGE UP (ref 135–145)
SPECIMEN SOURCE: SIGNIFICANT CHANGE UP
WBC # BLD: 6.28 K/UL — SIGNIFICANT CHANGE UP (ref 3.8–10.5)
WBC # FLD AUTO: 6.28 K/UL — SIGNIFICANT CHANGE UP (ref 3.8–10.5)

## 2024-03-16 PROCEDURE — 99232 SBSQ HOSP IP/OBS MODERATE 35: CPT | Mod: GC

## 2024-03-16 RX ORDER — SODIUM,POTASSIUM PHOSPHATES 278-250MG
1 POWDER IN PACKET (EA) ORAL ONCE
Refills: 0 | Status: COMPLETED | OUTPATIENT
Start: 2024-03-16 | End: 2024-03-16

## 2024-03-16 RX ORDER — CHLORHEXIDINE GLUCONATE 213 G/1000ML
1 SOLUTION TOPICAL DAILY
Refills: 0 | Status: DISCONTINUED | OUTPATIENT
Start: 2024-03-16 | End: 2024-03-25

## 2024-03-16 RX ADMIN — Medication 1000 UNIT(S): at 13:02

## 2024-03-16 RX ADMIN — Medication 3 MILLIGRAM(S): at 21:16

## 2024-03-16 RX ADMIN — SENNA PLUS 2 TABLET(S): 8.6 TABLET ORAL at 21:17

## 2024-03-16 RX ADMIN — Medication 1 PACKET(S): at 01:33

## 2024-03-16 RX ADMIN — ARIPIPRAZOLE 10 MILLIGRAM(S): 15 TABLET ORAL at 13:02

## 2024-03-16 RX ADMIN — ENOXAPARIN SODIUM 40 MILLIGRAM(S): 100 INJECTION SUBCUTANEOUS at 05:29

## 2024-03-16 RX ADMIN — DIVALPROEX SODIUM 500 MILLIGRAM(S): 500 TABLET, DELAYED RELEASE ORAL at 18:43

## 2024-03-16 RX ADMIN — CHLORHEXIDINE GLUCONATE 1 APPLICATION(S): 213 SOLUTION TOPICAL at 13:05

## 2024-03-16 RX ADMIN — SIMVASTATIN 10 MILLIGRAM(S): 20 TABLET, FILM COATED ORAL at 21:17

## 2024-03-16 RX ADMIN — DIVALPROEX SODIUM 500 MILLIGRAM(S): 500 TABLET, DELAYED RELEASE ORAL at 05:26

## 2024-03-16 RX ADMIN — PANTOPRAZOLE SODIUM 40 MILLIGRAM(S): 20 TABLET, DELAYED RELEASE ORAL at 05:27

## 2024-03-16 NOTE — PHYSICAL THERAPY INITIAL EVALUATION ADULT - ADDITIONAL COMMENTS
Pt lives in a group home where he receives some assist for ADLs and typically ambulates with a RW. Group home has +5 steps to enter with no steps inside.

## 2024-03-16 NOTE — PROGRESS NOTE ADULT - ATTENDING COMMENTS
63m pmh intellectual disability, seizure d/o, bipolar, gerd presents from group home for eval of persistent LE weakness resulting in limited mobility      LE weakness  - CT Cspine- no high grade central spinal stenosis.  - CT lumbarspine- no fracture or malalignment  - will get MRI lumbar spine to eval for spine cord lesion causing LE weaking  - Neuro eval

## 2024-03-16 NOTE — PHYSICAL THERAPY INITIAL EVALUATION ADULT - PERTINENT HX OF CURRENT PROBLEM, REHAB EVAL
Pt is a 64 y/o male admitted with weakness. PMH: intellectual disability, seizure d/o, bipolar, gerd. Pt was discharged today from [place] rehab and went back to his group home. Pt noted to be weak, normally ambulates w/a walker but is very unsteady and almost fell serval times today at the group home and required significant assistance from group home members.    CTH demonstrates No acute intracranial hemorrhage or mass effect. Mild chronic ischemic changes of the frontal parietal white matter. Progressive cerebral and cerebellar volume loss compared with prior exam from 2017. Ventricular configuration is similar to the prior exam. CT Cervical spine demonstrates No acute cervical spine fracture or evidence of traumatic malalignment. Mild cervical spondylosis without high-grade central spinal canal stenosis. Varying degrees of neural foraminal stenosis, most severe at   C3-C4 on the left, C4-C5 on the right, C5-C6 on the right, and C6-7 on the right. CXR demonstrates No acute lumbar spine fracture or evidence of traumatic malalignment. Mild lumbar spondylosis. No high-grade central spinal canal stenosis.

## 2024-03-16 NOTE — PROGRESS NOTE ADULT - PROBLEM SELECTOR PLAN 1
Recent d/cd today from [place] rehab. Went back to his group home and noted to be very weak.  Ambulates w/ walker but is very unsteady, had several near fall episodes at the group home requiring full assistance  Patient is high risk for fall given weakness and is unsafe to stay at group home     - Review of labs and imaging unrevealing.  CK wnl, RVP/COVID neg.   - F/u UA/Ucx pending collection   - Likely component of debility in this frail older adult   - Fall precaution    - PT consult   - SW/ CM for safe dispo planning Recent d/cd today from [place] rehab. Went back to his group home and noted to be very weak.  Ambulates w/ walker but is very unsteady, had several near fall episodes at the group home requiring full assistance  Patient is high risk for fall given weakness and is unsafe to stay at group home     - Review of labs and imaging unrevealing.  CK wnl, RVP/COVID neg.   - F/u UA/Ucx pending collection   - Likely component of debility in this frail older adult   - Fall precaution    - PT consult   - SW/ CM for safe dispo planning  - B12, Folate, TSH, MRI lumbar imaging Recent d/cd from [place] rehab. Went back to his group home and noted to be very weak.  Ambulates w/ walker but is very unsteady, had several near fall episodes at the group home requiring full assistance  Patient is high risk for fall given weakness and is unsafe to stay at group home     - Review of labs and imaging unrevealing.  CK wnl, RVP/COVID neg.   - F/u UA/Ucx pending collection   - Likely component of debility in this frail older adult   - Fall precaution    - PT consult   - SW/ CM for safe dispo planning  - B12, Folate, TSH, MRI lumbar imaging

## 2024-03-16 NOTE — PROGRESS NOTE ADULT - SUBJECTIVE AND OBJECTIVE BOX
***************************************************************  Nickblount ([facility]) [facility] PGY2  Internal Medicine   ***************************************************************    NEIDA ALCARAZ  63y  MRN: [id number]    Patient is a 63y old  Male who presents with a chief complaint of Weakness (15 Mar 2024 23:10)      Subjective: no events ON. Denies fever, CP, SOB, abn pain, N/V, dysuria. Tolerating diet.      MEDICATIONS  (STANDING):  ARIPiprazole 10 milliGRAM(s) Oral daily  cholecalciferol 1000 Unit(s) Oral daily  divalproex  milliGRAM(s) Oral two times a day  enoxaparin Injectable 40 milliGRAM(s) SubCutaneous every 24 hours  pantoprazole    Tablet 40 milliGRAM(s) Oral before breakfast  senna 2 Tablet(s) Oral at bedtime  simvastatin 10 milliGRAM(s) Oral at bedtime    MEDICATIONS  (PRN):  acetaminophen     Tablet .. 650 milliGRAM(s) Oral every 6 hours PRN Temp greater or equal to 38C (100.4F), Mild Pain (1 - 3)  aluminum hydroxide/magnesium hydroxide/simethicone Suspension 30 milliLiter(s) Oral every 4 hours PRN Dyspepsia  melatonin 3 milliGRAM(s) Oral at bedtime PRN Insomnia  ondansetron Injectable 4 milliGRAM(s) IV Push every 8 hours PRN Nausea and/or Vomiting      Objective:    Vitals: Vital Signs Last 24 Hrs  T(C): 37.2 (03-15-24 @ 23:00), Max: 37.2 (03-15-24 @ 19:25)  T(F): 98.9 (03-15-24 @ 23:00), Max: 99 (03-15-24 @ 19:25)  HR: 83 (03-15-24 @ 23:00) (81 - 92)  BP: 112/66 (03-15-24 @ 23:00) (112/66 - 118/84)  BP(mean): 87 (03-15-24 @ 19:25) (87 - 87)  RR: 18 (03-15-24 @ 23:00) (18 - 18)  SpO2: 95% (03-15-24 @ 23:00) (95% - 97%)            I&O's Summary      PHYSICAL EXAM:  GENERAL: NAD  HEAD:  Atraumatic, Normocephalic  EYES: EOMI, conjunctiva and sclera clear  CHEST/LUNG: Clear to auscultation bilaterally; No rales, rhonchi, wheezing, or rubs  HEART: Regular rate and rhythm; No murmurs, rubs, or gallops  ABDOMEN: Soft, Nontender, Nondistended;   SKIN: No rashes or lesions  NERVOUS SYSTEM:  Alert & Oriented X3, no focal deficits    LABS:  03-15    143  |  108  |  26<H>  ----------------------------<  101<H>  4.2   |  22  |  1.07    Ca    9.5      15 Mar 2024 19:23  Phos  2.4     -15  Mg     2.2     -15    TPro  7.0  /  Alb  4.0  /  TBili  0.3  /  DBili  x   /  AST  13  /  ALT  9<L>  /  AlkPhos  81  03-15                    Urinalysis Basic - ( 15 Mar 2024 22:27 )    Color: Yellow / Appearance: Clear / S.025 / pH: x  Gluc: x / Ketone: Small  / Bili: Negative / Urobili: <2 mg/dL   Blood: x / Protein: Negative / Nitrite: Negative   Leuk Esterase: Negative / RBC: 1 /HPF / WBC 1 /HPF   Sq Epi: x / Non Sq Epi: x / Bacteria: Negative /HPF                              14.9   9.98  )-----------( 200      ( 15 Mar 2024 19:23 )             43.5     CAPILLARY BLOOD GLUCOSE          RADIOLOGY & ADDITIONAL TESTS:    Imaging Personally Reviewed:  [x ] YES  [ ] NO    Consultants involved in case:   Consultant(s) Notes Reviewed:  [ x] YES  [ ] NO:   Care Discussed with Consultants/Other Providers [x ] YES  [ ] NO         ***************************************************************  Nick  [facility] PGY2  Internal Medicine   ***************************************************************    NEIDA ALCARAZ  63y  MRN: [id number]    Patient is a 63y old  Male who presents with a chief complaint of Weakness (15 Mar 2024 23:10)      Subjective: NAEO. Reports continuing LE weakness.    MEDICATIONS  (STANDING):  ARIPiprazole 10 milliGRAM(s) Oral daily  cholecalciferol 1000 Unit(s) Oral daily  divalproex  milliGRAM(s) Oral two times a day  enoxaparin Injectable 40 milliGRAM(s) SubCutaneous every 24 hours  pantoprazole    Tablet 40 milliGRAM(s) Oral before breakfast  senna 2 Tablet(s) Oral at bedtime  simvastatin 10 milliGRAM(s) Oral at bedtime    MEDICATIONS  (PRN):  acetaminophen     Tablet .. 650 milliGRAM(s) Oral every 6 hours PRN Temp greater or equal to 38C (100.4F), Mild Pain (1 - 3)  aluminum hydroxide/magnesium hydroxide/simethicone Suspension 30 milliLiter(s) Oral every 4 hours PRN Dyspepsia  melatonin 3 milliGRAM(s) Oral at bedtime PRN Insomnia  ondansetron Injectable 4 milliGRAM(s) IV Push every 8 hours PRN Nausea and/or Vomiting      Objective:    Vitals: Vital Signs Last 24 Hrs  T(C): 37.2 (03-15-24 @ 23:00), Max: 37.2 (03-15-24 @ 19:25)  T(F): 98.9 (03-15-24 @ 23:00), Max: 99 (03-15-24 @ 19:25)  HR: 83 (03-15-24 @ 23:00) (81 - 92)  BP: 112/66 (03-15-24 @ 23:00) (112/66 - 118/84)  BP(mean): 87 (03-15-24 @ 19:25) (87 - 87)  RR: 18 (03-15-24 @ 23:00) (18 - 18)  SpO2: 95% (03-15-24 @ 23:00) (95% - 97%)            I&O's Summary      PHYSICAL EXAM:  GENERAL: NAD  HEAD:  Atraumatic, Normocephalic  EYES: EOMI, conjunctiva and sclera clear  CHEST/LUNG: Clear to auscultation bilaterally; No rales, rhonchi, wheezing, or rubs  HEART: Regular rate and rhythm; No murmurs, rubs, or gallops  ABDOMEN: Soft, Nontender, Nondistended;   SKIN: No rashes or lesions  NERVOUS SYSTEM:  Alert & Oriented X3, b/l LE weakness    LABS:  03-15    143  |  108  |  26<H>  ----------------------------<  101<H>  4.2   |  22  |  1.07    Ca    9.5      15 Mar 2024 19:23  Phos  2.4     -15  Mg     2.2     -15    TPro  7.0  /  Alb  4.0  /  TBili  0.3  /  DBili  x   /  AST  13  /  ALT  9<L>  /  AlkPhos  81  03-15                    Urinalysis Basic - ( 15 Mar 2024 22:27 )    Color: Yellow / Appearance: Clear / S.025 / pH: x  Gluc: x / Ketone: Small  / Bili: Negative / Urobili: <2 mg/dL   Blood: x / Protein: Negative / Nitrite: Negative   Leuk Esterase: Negative / RBC: 1 /HPF / WBC 1 /HPF   Sq Epi: x / Non Sq Epi: x / Bacteria: Negative /HPF                              14.9   9.98  )-----------( 200      ( 15 Mar 2024 19:23 )             43.5     CAPILLARY BLOOD GLUCOSE          RADIOLOGY & ADDITIONAL TESTS:    Imaging Personally Reviewed:  [x ] YES  [ ] NO    Consultants involved in case:   Consultant(s) Notes Reviewed:  [ x] YES  [ ] NO:   Care Discussed with Consultants/Other Providers [x ] YES  [ ] NO

## 2024-03-17 LAB
FOLATE SERPL-MCNC: 13.6 NG/ML — SIGNIFICANT CHANGE UP
TSH SERPL-MCNC: 1.72 UIU/ML — SIGNIFICANT CHANGE UP (ref 0.27–4.2)
VIT B12 SERPL-MCNC: 518 PG/ML — SIGNIFICANT CHANGE UP (ref 232–1245)

## 2024-03-17 PROCEDURE — 99232 SBSQ HOSP IP/OBS MODERATE 35: CPT | Mod: GC

## 2024-03-17 RX ADMIN — ENOXAPARIN SODIUM 40 MILLIGRAM(S): 100 INJECTION SUBCUTANEOUS at 05:39

## 2024-03-17 RX ADMIN — SENNA PLUS 2 TABLET(S): 8.6 TABLET ORAL at 21:26

## 2024-03-17 RX ADMIN — DIVALPROEX SODIUM 500 MILLIGRAM(S): 500 TABLET, DELAYED RELEASE ORAL at 18:09

## 2024-03-17 RX ADMIN — DIVALPROEX SODIUM 500 MILLIGRAM(S): 500 TABLET, DELAYED RELEASE ORAL at 05:38

## 2024-03-17 RX ADMIN — Medication 1000 UNIT(S): at 12:46

## 2024-03-17 RX ADMIN — SIMVASTATIN 10 MILLIGRAM(S): 20 TABLET, FILM COATED ORAL at 21:26

## 2024-03-17 RX ADMIN — PANTOPRAZOLE SODIUM 40 MILLIGRAM(S): 20 TABLET, DELAYED RELEASE ORAL at 05:38

## 2024-03-17 RX ADMIN — ARIPIPRAZOLE 10 MILLIGRAM(S): 15 TABLET ORAL at 12:46

## 2024-03-17 NOTE — PROGRESS NOTE ADULT - PROBLEM SELECTOR PLAN 1
Recent d/cd from [facility]. Went back to his group home and noted to be very weak.  Ambulates w/ walker but is very unsteady, had several near fall episodes at the group home requiring full assistance  Patient is high risk for fall given weakness and is unsafe to stay at group home     - Review of labs and imaging unrevealing.  CK wnl, RVP/COVID neg.   - F/u UA/Ucx pending collection   - Likely component of debility in this frail older adult   - Fall precaution    - PT consult   - SW/ CM for safe dispo planning  - B12, Folate, TSH, MRI lumbar imaging [facility]. Went back to his group home and noted to be very weak.  Ambulates w/ walker but is very unsteady, had several near fall episodes at the group home requiring full assistance  Patient is high risk for fall given weakness and is unsafe to stay at group home     - Review of labs and imaging unrevealing.  CK wnl, RVP/COVID neg.   - F/u UA/Ucx pending collection   - Likely component of debility in this frail older adult   - Fall precaution    - PT consult   - SW/ CM for safe dispo planning  - B12, Folate, TSH normal  - MRI lumbar imaging pending consent -- called group home today but no response

## 2024-03-17 NOTE — PROGRESS NOTE ADULT - SUBJECTIVE AND OBJECTIVE BOX
***************************************************************  Mary Richards, PGY-2  Internal Medicine   pager: [phone]  ***************************************************************    NEIDA ALCARAZ  MRN: [id number]    Patient is a 63y old Male who presents with a chief complaint of Weakness (16 Mar 2024 05:41)    Subjective: No acute events overnight.      MEDICATIONS  (STANDING):  ARIPiprazole 10 milliGRAM(s) Oral daily  chlorhexidine 4% Liquid 1 Application(s) Topical daily  cholecalciferol 1000 Unit(s) Oral daily  divalproex  milliGRAM(s) Oral two times a day  enoxaparin Injectable 40 milliGRAM(s) SubCutaneous every 24 hours  pantoprazole    Tablet 40 milliGRAM(s) Oral before breakfast  senna 2 Tablet(s) Oral at bedtime  simvastatin 10 milliGRAM(s) Oral at bedtime    MEDICATIONS  (PRN):  acetaminophen     Tablet .. 650 milliGRAM(s) Oral every 6 hours PRN Temp greater or equal to 38C (100.4F), Mild Pain (1 - 3)  aluminum hydroxide/magnesium hydroxide/simethicone Suspension 30 milliLiter(s) Oral every 4 hours PRN Dyspepsia  melatonin 3 milliGRAM(s) Oral at bedtime PRN Insomnia  ondansetron Injectable 4 milliGRAM(s) IV Push every 8 hours PRN Nausea and/or Vomiting    Objective:  Vitals: Vital Signs Last 24 Hrs  T(C): 37.1 (03-17-24 @ 04:51), Max: 37.1 (03-17-24 @ 04:51)  T(F): 98.8 (03-17-24 @ 04:51), Max: 98.8 (03-17-24 @ 04:51)  HR: 80 (03-17-24 @ 04:51) (75 - 86)  BP: 130/70 (03-17-24 @ 04:51) (104/64 - 131/95)  RR: 18 (03-17-24 @ 04:51) (17 - 18)  SpO2: 91% (03-17-24 @ 04:51) (91% - 97%)               I&O's Summary    16 Mar 2024 07:01  -  17 Mar 2024 07:00  --------------------------------------------------------  IN: 0 mL / OUT: 1700 mL / NET: -1700 mL    PHYSICAL EXAM:  General: NAD, resting in bed, on RA  Lungs: clear to auscultation in anterior lung fields, no wheezes/rhonchi/rales  Heart: +s1/s2, RRR, no murmurs/gallops/rubs  Abdomen: soft, non-distended, non-tender to palpation, no rebound/guarding/rigidity, bowel sounds present  Extremities: +DP pulse bilaterally, no cyanosis/clubbing/edema    LABS:  03-16    143  |  108  |  22  ----------------------------<  135<H>  3.9   |  24  |  0.67  03-15    143  |  108  |  26<H>  ----------------------------<  101<H>  4.2   |  22  |  1.07    Ca    9.1      16 Mar 2024 11:11  Ca    9.5      15 Mar 2024 19:23  Phos  2.4     03-15  Mg     2.2     03-15    TPro  7.0  /  Alb  4.0  /  TBili  0.3  /  DBili  x   /  AST  13  /  ALT  9<L>  /  AlkPhos  81  03-15    PT/INR - ( 16 Mar 2024 11:11 )   PT: 11.3 sec;   INR: 1.08 ratio      Urinalysis Basic - ( 16 Mar 2024 11:11 )    Color: x / Appearance: x / SG: x / pH: x  Gluc: 135 mg/dL / Ketone: x  / Bili: x / Urobili: x   Blood: x / Protein: x / Nitrite: x   Leuk Esterase: x / RBC: x / WBC x   Sq Epi: x / Non Sq Epi: x / Bacteria: x                        14.2   6.28  )-----------( 178      ( 16 Mar 2024 11:11 )             41.4                         14.9   9.98  )-----------( 200      ( 15 Mar 2024 19:23 )             43.5     RADIOLOGY & ADDITIONAL TESTS:    Imaging Personally Reviewed:  [x] YES  [ ] NO    Consultants involved in case:   Consultant(s) Notes Reviewed:  [x] YES  [ ] NO:   Care Discussed with Consultants/Other Providers [x] YES  [ ] NO ***************************************************************  Mary Richards, PGY-2  Internal Medicine   pager: [phone]  ***************************************************************    NEIDA ALCARAZ  MRN: [id number]    Patient is a 63y old Male who presents with a chief complaint of Weakness (16 Mar 2024 05:41)    Subjective: No acute events overnight. Denies pain. Waiting for breakfast.       MEDICATIONS  (STANDING):  ARIPiprazole 10 milliGRAM(s) Oral daily  chlorhexidine 4% Liquid 1 Application(s) Topical daily  cholecalciferol 1000 Unit(s) Oral daily  divalproex  milliGRAM(s) Oral two times a day  enoxaparin Injectable 40 milliGRAM(s) SubCutaneous every 24 hours  pantoprazole    Tablet 40 milliGRAM(s) Oral before breakfast  senna 2 Tablet(s) Oral at bedtime  simvastatin 10 milliGRAM(s) Oral at bedtime    MEDICATIONS  (PRN):  acetaminophen     Tablet .. 650 milliGRAM(s) Oral every 6 hours PRN Temp greater or equal to 38C (100.4F), Mild Pain (1 - 3)  aluminum hydroxide/magnesium hydroxide/simethicone Suspension 30 milliLiter(s) Oral every 4 hours PRN Dyspepsia  melatonin 3 milliGRAM(s) Oral at bedtime PRN Insomnia  ondansetron Injectable 4 milliGRAM(s) IV Push every 8 hours PRN Nausea and/or Vomiting    Objective:  Vitals: Vital Signs Last 24 Hrs  T(C): 37.1 (03-17-24 @ 04:51), Max: 37.1 (03-17-24 @ 04:51)  T(F): 98.8 (03-17-24 @ 04:51), Max: 98.8 (03-17-24 @ 04:51)  HR: 80 (03-17-24 @ 04:51) (75 - 86)  BP: 130/70 (03-17-24 @ 04:51) (104/64 - 131/95)  RR: 18 (03-17-24 @ 04:51) (17 - 18)  SpO2: 91% (03-17-24 @ 04:51) (91% - 97%)               I&O's Summary    16 Mar 2024 07:01  -  17 Mar 2024 07:00  --------------------------------------------------------  IN: 0 mL / OUT: 1700 mL / NET: -1700 mL    PHYSICAL EXAM:  General: NAD, resting in bed, on RA  Lungs: clear to auscultation in anterior lung fields, no wheezes  Heart: +s1/s2, RRR, no murmurs/gallops/rubs  Abdomen: soft, non-distended, non-tender to palpation, no rigidity  Extremities: no peripheral edema bilaterally, moves both extremities    LABS:  03-16    143  |  108  |  22  ----------------------------<  135<H>  3.9   |  24  |  0.67  03-15    143  |  108  |  26<H>  ----------------------------<  101<H>  4.2   |  22  |  1.07    Ca    9.1      16 Mar 2024 11:11  Ca    9.5      15 Mar 2024 19:23  Phos  2.4     03-15  Mg     2.2     03-15    TPro  7.0  /  Alb  4.0  /  TBili  0.3  /  DBili  x   /  AST  13  /  ALT  9<L>  /  AlkPhos  81  03-15    PT/INR - ( 16 Mar 2024 11:11 )   PT: 11.3 sec;   INR: 1.08 ratio      Urinalysis Basic - ( 16 Mar 2024 11:11 )    Color: x / Appearance: x / SG: x / pH: x  Gluc: 135 mg/dL / Ketone: x  / Bili: x / Urobili: x   Blood: x / Protein: x / Nitrite: x   Leuk Esterase: x / RBC: x / WBC x   Sq Epi: x / Non Sq Epi: x / Bacteria: x                        14.2   6.28  )-----------( 178      ( 16 Mar 2024 11:11 )             41.4                         14.9   9.98  )-----------( 200      ( 15 Mar 2024 19:23 )             43.5     RADIOLOGY & ADDITIONAL TESTS:    Imaging Personally Reviewed:  [x] YES  [ ] NO    Consultants involved in case:   Consultant(s) Notes Reviewed:  [x] YES  [ ] NO:   Care Discussed with Consultants/Other Providers [x] YES  [ ] NO

## 2024-03-18 ENCOUNTER — TRANSCRIPTION ENCOUNTER (OUTPATIENT)
Age: 64
End: 2024-03-18

## 2024-03-18 PROCEDURE — 99232 SBSQ HOSP IP/OBS MODERATE 35: CPT | Mod: GC

## 2024-03-18 PROCEDURE — 77074 RADEX OSSEOUS SURVEY LMTD: CPT | Mod: 26

## 2024-03-18 RX ADMIN — ENOXAPARIN SODIUM 40 MILLIGRAM(S): 100 INJECTION SUBCUTANEOUS at 05:47

## 2024-03-18 RX ADMIN — DIVALPROEX SODIUM 500 MILLIGRAM(S): 500 TABLET, DELAYED RELEASE ORAL at 05:47

## 2024-03-18 RX ADMIN — CHLORHEXIDINE GLUCONATE 1 APPLICATION(S): 213 SOLUTION TOPICAL at 11:34

## 2024-03-18 RX ADMIN — Medication 1000 UNIT(S): at 11:35

## 2024-03-18 RX ADMIN — PANTOPRAZOLE SODIUM 40 MILLIGRAM(S): 20 TABLET, DELAYED RELEASE ORAL at 05:47

## 2024-03-18 RX ADMIN — SIMVASTATIN 10 MILLIGRAM(S): 20 TABLET, FILM COATED ORAL at 21:10

## 2024-03-18 RX ADMIN — ARIPIPRAZOLE 10 MILLIGRAM(S): 15 TABLET ORAL at 11:35

## 2024-03-18 RX ADMIN — DIVALPROEX SODIUM 500 MILLIGRAM(S): 500 TABLET, DELAYED RELEASE ORAL at 18:26

## 2024-03-18 NOTE — DISCHARGE NOTE PROVIDER - NSFOLLOWUPCLINICS_GEN_ALL_ED_FT
[facility]  Internal Medicine  [address] [place] [address]  Phone: [phone]  Fax: [phone]

## 2024-03-18 NOTE — DISCHARGE NOTE PROVIDER - NSDCCPCAREPLAN_GEN_ALL_CORE_FT
PRINCIPAL DISCHARGE DIAGNOSIS  Diagnosis: Acute weakness  Assessment and Plan of Treatment: You came into the hospital for weakness in both your legs. We did some initial blood work testing for various vitamins, thyroid hormone, viral infections which came back as normal. We also did mulitple imaging of your head, neck, lumbar spine with both CT and MRI which showed ____.  .  Please return back to the ED if you can't move one or both your legs, get sharp shooting pains down your leg, numbness/tingly of the leg(s), numbness in your gluteal region, incontinence both urinary or bpwel incontinence. Please follow up with your PCP and take all medicaitons as prescribed.     PRINCIPAL DISCHARGE DIAGNOSIS  Diagnosis: Acute weakness  Assessment and Plan of Treatment: You came into the hospital for weakness in both your legs. We did some initial blood work testing for various vitamins, thyroid hormone, viral infections which came back as normal. We also did mulitple imaging of your head, neck, lumbar spine with both CT and MRI which showed no acute findings. We spoke to our spine surgeon colleagues who did not recommend any surgery. We feel that your weakness may just be general debility and you benefit from going to a rehab center.   Please return back to the ED if you can't move one or both your legs, get sharp shooting pains down your leg, numbness/tingly of the leg(s), numbness in your gluteal region, incontinence both urinary or bpwel incontinence. Please follow up with your PCP and take all medicaitons as prescribed.

## 2024-03-18 NOTE — DISCHARGE NOTE PROVIDER - NSDCCPTREATMENT_GEN_ALL_CORE_FT
PRINCIPAL PROCEDURE  Procedure: MRI lumbar spine w/w/o contrst  Findings and Treatment:       SECONDARY PROCEDURE  Procedure: CT head  Findings and Treatment: FINDINGS:  There is no acute cervical spine fracture or evidence of traumatic   malalignment. There is no significant prevertebral soft tissue swelling.   There is no suspicious lytic or sclerotic bony lesion. There is   multilevel intervertebral disc space narrowing, small disc bulges and   disc osteophyte complexes, and facet and uncinate hypertrophy. There is   no high-grade central spinal canal stenosis within the confines of this   exam. There are varying degrees of neural foraminal stenosis, most severe   at C3-C4 on the left, C4-C5 on the right, C5-C6 on the right, and C6-C7   on the right. The regional soft tissues of the neck are otherwise   unremarkable. The lung apices are clear.  IMPRESSION:  No acute cervical spine fracture or evidence of traumatic malalignment.   Mild cervical spondylosis without high-grade central spinal canal   stenosis. Varying degrees of neural foraminal stenosis, most severe at   C3-C4 on the left, C4-C5 on the right, C5-C6 on the right, and C6-7 on   the right.  --- End of Report ---    Procedure: CT neck  Findings and Treatment:   < end of copied text >  FINDINGS:  There is no acute intra-axial or extra-axial hemorrhage. There is no mass   effect or shift of the midline. The basal cisterns are not effaced. There   is progressive cerebral and cerebellar volume loss compared with the   prior exam from 2017. Ventricular configuration is similar to the prior   exam. There are mild chronic ischemic changes in the frontoparietal white   matter. There are mild atherosclerotic calcifications of the intracranial   carotid arteries.  The regional soft tissues and osseous structures are unremarkable. The   visualized paranasal sinuses and tympanic/mastoid cavities are clear.  IMPRESSION:  No acute intracranial hemorrhage or mass effect. Mild chronic ischemic   changes of the frontal parietal white matter.  Progressive cerebral and cerebellar volume loss compared with prior exam   from 2017. Ventricular configuration is similar to the prior exam.< from: CT Head No Cont (03.15.24 @ 21:41) >      Procedure: CT lumbar spine  Findings and Treatment: FINDINGS:  The lumbar lordosis is maintained. There is no acute lumbar spine   fracture lucency or compression deformity. There is no evidence of   traumatic malalignment. There is no significant paravertebral soft tissue   swelling/hematoma. There is mild multilevel intervertebral disc space   narrowing, mild disc bulges and disc osteophyte complexes and facet and   ligamentous hypertrophy resultingin multilevel mild to moderate spinal   canal stenosis, most notable at L4-L5. Minimal anterolisthesis of L4 and   L5 is due to degenerated facets. There is right-sided neural foraminal   narrowing at L4 and L5. Sclerotic foci in the right iliac wingmost   likely represent bone islands. The visualized portions of the abdomen and   pelvis are unremarkable apart from a right renal cyst and vascular   atherosclerotic calcifications.  IMPRESSION:  No acute lumbar spine fracture or evidence of traumatic malalignment.   Mild lumbar spondylosis. No high-grade central spinal canal stenosis.  --- End of Report ---       PRINCIPAL PROCEDURE  Procedure: MRI lumbar spine w/w/o contrst  Findings and Treatment: IMPRESSION:  No acute fracture or suspected metastatic disease.  Small hemangiomas at L1 and L3.  L1-2 far left lateral small disc protrusion.  L2-3 small amount of enhancing fluid in the facet joints, right greater   than left.  L3-4 suspected small far left lateral disc protrusion with annular tear.  L4-5 broad-based disc bulge with posterior annular tear. Moderate canal   stenosis. Lateral recess stenosis bilaterally. Small amount of bilateral   enhancing fluid in the facet joints.  L5-S1 Central disc protrusion abutting the descending left S1 nerve root   in the lateral recess.        SECONDARY PROCEDURE  Procedure: CT head  Findings and Treatment: FINDINGS:  There is no acute cervical spine fracture or evidence of traumatic   malalignment. There is no significant prevertebral soft tissue swelling.   There is no suspicious lytic or sclerotic bony lesion. There is   multilevel intervertebral disc space narrowing, small disc bulges and   disc osteophyte complexes, and facet and uncinate hypertrophy. There is   no high-grade central spinal canal stenosis within the confines of this   exam. There are varying degrees of neural foraminal stenosis, most severe   at C3-C4 on the left, C4-C5 on the right, C5-C6 on the right, and C6-C7   on the right. The regional soft tissues of the neck are otherwise   unremarkable. The lung apices are clear.  IMPRESSION:  No acute cervical spine fracture or evidence of traumatic malalignment.   Mild cervical spondylosis without high-grade central spinal canal   stenosis. Varying degrees of neural foraminal stenosis, most severe at   C3-C4 on the left, C4-C5 on the right, C5-C6 on the right, and C6-7 on   the right.  --- End of Report ---    Procedure: CT neck  Findings and Treatment:   < end of copied text >  FINDINGS:  There is no acute intra-axial or extra-axial hemorrhage. There is no mass   effect or shift of the midline. The basal cisterns are not effaced. There   is progressive cerebral and cerebellar volume loss compared with the   prior exam from 2017. Ventricular configuration is similar to the prior   exam. There are mild chronic ischemic changes in the frontoparietal white   matter. There are mild atherosclerotic calcifications of the intracranial   carotid arteries.  The regional soft tissues and osseous structures are unremarkable. The   visualized paranasal sinuses and tympanic/mastoid cavities are clear.  IMPRESSION:  No acute intracranial hemorrhage or mass effect. Mild chronic ischemic   changes of the frontal parietal white matter.  Progressive cerebral and cerebellar volume loss compared with prior exam   from 2017. Ventricular configuration is similar to the prior exam.< from: CT Head No Cont (03.15.24 @ 21:41) >      Procedure: CT lumbar spine  Findings and Treatment: FINDINGS:  The lumbar lordosis is maintained. There is no acute lumbar spine   fracture lucency or compression deformity. There is no evidence of   traumatic malalignment. There is no significant paravertebral soft tissue   swelling/hematoma. There is mild multilevel intervertebral disc space   narrowing, mild disc bulges and disc osteophyte complexes and facet and   ligamentous hypertrophy resultingin multilevel mild to moderate spinal   canal stenosis, most notable at L4-L5. Minimal anterolisthesis of L4 and   L5 is due to degenerated facets. There is right-sided neural foraminal   narrowing at L4 and L5. Sclerotic foci in the right iliac wingmost   likely represent bone islands. The visualized portions of the abdomen and   pelvis are unremarkable apart from a right renal cyst and vascular   atherosclerotic calcifications.  IMPRESSION:  No acute lumbar spine fracture or evidence of traumatic malalignment.   Mild lumbar spondylosis. No high-grade central spinal canal stenosis.  --- End of Report ---      Procedure: MRI cervical spine  Findings and Treatment: FINDINGS:  ALIGNMENT: Straightening of the cervical spine. No spondylolisthesis in   the cervical or thoracic spine.  VERTEBRAE: No fractures or marrow edema. No bony destructive lesions. Few   endplate degenerative changes are present in the cervical and thoracic   spine.  DISCS: Multilevel degenerative disc disease with disc space narrowing   most notable at C6-7.  CORD: No cord compression in the cervical or thoracic spine. The axial   images are motion degraded limitingassessment of the spinal cord on   axial series. The sagittal series are good quality and demonstrate no   convincing areas of signal abnormality in the spinal cord.  EVALUATION OF INDIVIDUAL LEVELS:  CERVICAL SPINE:  No high-grade spinal stenosis at any of the cervical levels. There is at   most mild narrowing of the spinal canal at C6-7 from the disc bulge.  Evaluation for foraminal stenosis is limited by extensive motion   artifact. There is suggestion of at least mild to moderate stenosis at   the left C3-4, right C4-5, right C5-6, bilateral C6-7 levels related to   uncovertebral and facet arthropathy.  THORACIC SPINE:  No high-grade spinal canal stenosis or neural foraminal stenosis at any   of the thoracic level. No large disc protrusions appreciated on the   sagittal series. Axial series is degraded by motion, limiting assessment  PARAVERTEBRAL SOFT TISSUES: Within normal limits  IMPRESSION:  No high-grade spinal stenosis in the cervical or thoracic spine. There is   mild narrowing of the spinal canal at C6-7.

## 2024-03-18 NOTE — PROGRESS NOTE ADULT - ATTENDING COMMENTS
63M pmh intellectual disability, seizure d/o, bipolar, gerd presents from group home for eval of persistent LE weakness resulting in limited mobility      LE weakness  - CT Cspine- no high grade central spinal stenosis.  - CT lumbarspine- no fracture or malalignment  - will get MRI lumbar spine to eval for spine cord lesion causing LE weakening --> will need to get skeletal surveys looking for metal for clearance before MRI. 63M pmh intellectual disability, seizure d/o, bipolar, gerd presents from group home for eval of persistent LE weakness resulting in limited mobility      LE weakness  - CT Cspine- no high grade central spinal stenosis.  - CT lumbarspine- no fracture or malalignment  - will get MRI lumbar spine to eval for spine cord lesion causing LE weakening --> will need to get skeletal surveys looking for metal for clearance before MRI.  - Will need neuro  for outpt EMG if he continues to have LE weakness.

## 2024-03-18 NOTE — DISCHARGE NOTE PROVIDER - NSDCMRMEDTOKEN_GEN_ALL_CORE_FT
ARIPiprazole 10 mg oral tablet: 1 tab(s) orally once a day  cholecalciferol 25 mcg (1000 intl units) oral tablet: 1 tab(s) orally once a day  divalproex sodium 500 mg oral delayed release tablet: 1 tab(s) orally 2 times a day  docusate sodium 100 mg oral capsule: 2 cap(s) orally once a day  pantoprazole 40 mg oral delayed release tablet: 1 tab(s) orally once a day (before a meal)  simvastatin 10 mg oral tablet: 1 tab(s) orally once a day (at bedtime)

## 2024-03-18 NOTE — PROGRESS NOTE ADULT - PROBLEM SELECTOR PLAN 1
[facility] to [facility] group [facility] and noted to be very weak.  Ambulates w/ walker but is very unsteady, had several near fall episodes at the group home requiring full assistance  Patient is high risk for fall given weakness and is unsafe to stay at group home     - Review of labs and imaging unrevealing.  CK wnl, RVP/COVID neg.   - UA/UC: negative  - Likely component of debility in this frail older adult   - Fall precaution    - pending MRI lumbar /pending consent   - PT consult : ANTOINETTE gooden  - SW/ CM for safe dispo planning  - B12, Folate, TSH normal  - MRI lumbar imaging pending consent -- called group home today but no response [facility] group [facility] and noted to be very weak.  Ambulates w/ walker but is very unsteady, had several near fall episodes at the group home requiring full assistance  Patient is high risk for fall given weakness and is unsafe to stay at group home     - Review of labs and imaging unrevealing.  CK wnl, RVP/COVID neg.   - UA/UC: negative  - Likely component of debility in this frail older adult   - Fall precaution    - pending MRI lumbar /pending consent   - PT consult : ANTOINETTE gooden  -per pt concerns will discuss SW/CM regarding home PT or outpt PT  - SW/ CM for safe dispo planning  - B12, Folate, TSH normal  - MRI lumbar imaging pending consent -- called group home today but no response Recent d/cd from [facility]. Went back to his group home and noted to be very weak.  Ambulates w/ walker but is very unsteady, had several near fall episodes at the group home requiring full assistance  Patient is high risk for fall given weakness and is unsafe to stay at group home     - Review of labs and imaging unrevealing.  CK wnl, RVP/COVID neg.   - UA/UC: negative  - Likely component of debility in this frail older adult   - Fall precaution    - pending MRI lumbar /pending consent; 3/18 spoke Dr. Morales to clear pt for MRI need skeletal survey of arms and legs  - PT consult : ANTOINETTE rec  -per pt concerns will discuss SW/CM regarding home PT or outpt PT; spoke with tee manager of group home indicated that pt needs be able walk 6 stairs to come back to group home  - SW/ CM for safe dispo planning  - B12, Folate, TSH normal  - MRI lumbar imaging pending consent -- called group home today but no response

## 2024-03-18 NOTE — PROGRESS NOTE ADULT - SUBJECTIVE AND OBJECTIVE BOX
PROGRESS NOTE:      Patient is a 63y old  Male who presents with a chief complaint of Weakness (17 Mar 2024 07:20)      SUBJECTIVE / OVERNIGHT EVENTS:  No acute events overnight. Pt notes no concerns, pain, chills or other symptoms. Group home assistant at bedside discussed obtained contact information of group home [facility] manager: [phone]. Pt notes doesn't preffer to go back to rehab.    ADDITIONAL REVIEW OF SYSTEMS:    MEDICATIONS  (STANDING):  ARIPiprazole 10 milliGRAM(s) Oral daily  chlorhexidine 4% Liquid 1 Application(s) Topical daily  cholecalciferol 1000 Unit(s) Oral daily  divalproex  milliGRAM(s) Oral two times a day  enoxaparin Injectable 40 milliGRAM(s) SubCutaneous every 24 hours  pantoprazole    Tablet 40 milliGRAM(s) Oral before breakfast  senna 2 Tablet(s) Oral at bedtime  simvastatin 10 milliGRAM(s) Oral at bedtime    MEDICATIONS  (PRN):  acetaminophen     Tablet .. 650 milliGRAM(s) Oral every 6 hours PRN Temp greater or equal to 38C (100.4F), Mild Pain (1 - 3)  aluminum hydroxide/magnesium hydroxide/simethicone Suspension 30 milliLiter(s) Oral every 4 hours PRN Dyspepsia  melatonin 3 milliGRAM(s) Oral at bedtime PRN Insomnia  ondansetron Injectable 4 milliGRAM(s) IV Push every 8 hours PRN Nausea and/or Vomiting      CAPILLARY BLOOD GLUCOSE        I&O's Summary    17 Mar 2024 07:01  -  18 Mar 2024 07:00  --------------------------------------------------------  IN: 0 mL / OUT: 1000 mL / NET: -1000 mL        PHYSICAL EXAM:  Vital Signs Last 24 Hrs  T(C): 36.8 (18 Mar 2024 05:29), Max: 37 (17 Mar 2024 21:23)  T(F): 98.2 (18 Mar 2024 05:29), Max: 98.6 (17 Mar 2024 21:23)  HR: 77 (18 Mar 2024 05:29) (77 - 105)  BP: 144/100 (18 Mar 2024 05:29) (110/69 - 144/100)  BP(mean): --  RR: 17 (18 Mar 2024 05:29) (17 - 18)  SpO2: 97% (18 Mar 2024 05:29) (96% - 97%)    Parameters below as of 18 Mar 2024 05:29  Patient On (Oxygen Delivery Method): room air      General: NAD, resting in bed, on RA  Lungs: clear to auscultation in anterior lung fields, no wheezes  Heart: +s1/s2, RRR, no murmurs/gallops/rubs  Abdomen: soft, non-distended, non-tender to palpation, no rigidity  Extremities: no peripheral edema bilaterally, moves both extremities  neuro: BLE w/ 4(barely)/5 strength at hip joint,    LABS:                        14.2   6.28  )-----------( 178      ( 16 Mar 2024 11:11 )             41.4     03-16    143  |  108  |  22  ----------------------------<  135<H>  3.9   |  24  |  0.67    Ca    9.1      16 Mar 2024 11:11      PT/INR - ( 16 Mar 2024 11:11 )   PT: 11.3 sec;   INR: 1.08 ratio               Urinalysis Basic - ( 16 Mar 2024 11:11 )    Color: x / Appearance: x / SG: x / pH: x  Gluc: 135 mg/dL / Ketone: x  / Bili: x / Urobili: x   Blood: x / Protein: x / Nitrite: x   Leuk Esterase: x / RBC: x / WBC x   Sq Epi: x / Non Sq Epi: x / Bacteria: x        Culture - Urine (collected 15 Mar 2024 22:27)  Source: Clean Catch Clean Catch (Midstream)  Final Report (16 Mar 2024 22:05):    <10,000 CFU/mL Normal Urogenital [place]        RADIOLOGY & ADDITIONAL TESTS:  Lab Results Reviewed   Imaging Reviewed  Electrocardiogram Reviewed

## 2024-03-18 NOTE — DISCHARGE NOTE PROVIDER - HOSPITAL COURSE
63m pmh intellectual disability, seizure d/o, bipolar, gerd presents for eval of weakness. Pt was discharged today from [facility] and went back to his group home. Pt noted to be weak, normally ambulates w/a walker but is very unsteady and almost fell serval times today at the group home and required significant assistance from group home members.  Patient is high risk for fall given weakness and is unsafe to stay at group home given he requires full assistance so was sent to ED for eval of weakness and safe dispo planning.    Hospital Course: You were admitted for bilateral lower extremity weakness which was worked up with testing for b12,folate,tsh, viral panel, CT cervical spine, CT lumbar, CTH, and MRI lumbar area all which showed no specific etiology for lower extremity weakness. You were re-evaluated by PT whom recommended ANTOINETTE. 63m pmh intellectual disability, seizure d/o, bipolar, gerd presents for eval of weakness. Pt was discharged today from [facility] and went back to his group home. Pt noted to be weak, normally ambulates w/a walker but is very unsteady and almost fell serval times today at the group home and required significant assistance from group home members.  Patient is high risk for fall given weakness and is unsafe to stay at group home given he requires full assistance so was sent to ED for eval of weakness and safe dispo planning.    Hospital Course: He was admitted for bilateral lower extremity weakness which was worked up with testing for b12,folate,tsh, viral panel, CT cervical spine, CT lumbar, CTH, and MRI lumbar area all which showed no specific etiology for lower extremity weakness. You were re-evaluated by PT whom recommended ANTOINETTE.    Medication Adjustments:  - None    Follow-ups:  - Primary care provider for hospital follow-up

## 2024-03-18 NOTE — DISCHARGE NOTE PROVIDER - NSDCFUADDAPPT_GEN_ALL_CORE_FT
APPTS ARE READY TO BE MADE: [ X] YES    Best Family or Patient Contact (if needed): Group home Nya: [phone]    Additional Information about above appointments (if needed):    1: F/u PCP within 2 weeks regarding hospital stay  2:   3:     Other comments or requests:    APPTS ARE READY TO BE MADE: [ X] YES    Best Family or Patient Contact (if needed): Group home Nya: [phone]    Additional Information about above appointments (if needed):    1: F/u PCP within 2 weeks regarding hospital stay.  2:   3:     Other comments or requests:    APPTS ARE READY TO BE MADE: [ X] YES    Best Family or Patient Contact (if needed): Group home Nya: [phone]    Additional Information about above appointments (if needed):    1: F/u PCP within 2 weeks regarding hospital stay.  2:   3:     Other comments or requests:         Patient is being discharged to rehab. Caregiver will arrange follow up.

## 2024-03-18 NOTE — PROGRESS NOTE ADULT - PROBLEM SELECTOR PLAN 4
- DVT ppx: Lovenox SQ   - GI ppx: PPI   - Diet: Regular, thin liquid   - PT consult     Dispo : ANTOINETTE

## 2024-03-19 PROCEDURE — 99232 SBSQ HOSP IP/OBS MODERATE 35: CPT | Mod: GC

## 2024-03-19 PROCEDURE — 72158 MRI LUMBAR SPINE W/O & W/DYE: CPT | Mod: 26

## 2024-03-19 RX ORDER — ALPRAZOLAM 0.25 MG
0.25 TABLET ORAL ONCE
Refills: 0 | Status: DISCONTINUED | OUTPATIENT
Start: 2024-03-19 | End: 2024-03-19

## 2024-03-19 RX ADMIN — ARIPIPRAZOLE 10 MILLIGRAM(S): 15 TABLET ORAL at 12:04

## 2024-03-19 RX ADMIN — PANTOPRAZOLE SODIUM 40 MILLIGRAM(S): 20 TABLET, DELAYED RELEASE ORAL at 06:13

## 2024-03-19 RX ADMIN — SIMVASTATIN 10 MILLIGRAM(S): 20 TABLET, FILM COATED ORAL at 22:23

## 2024-03-19 RX ADMIN — DIVALPROEX SODIUM 500 MILLIGRAM(S): 500 TABLET, DELAYED RELEASE ORAL at 18:49

## 2024-03-19 RX ADMIN — Medication 0.25 MILLIGRAM(S): at 16:42

## 2024-03-19 RX ADMIN — DIVALPROEX SODIUM 500 MILLIGRAM(S): 500 TABLET, DELAYED RELEASE ORAL at 06:12

## 2024-03-19 RX ADMIN — Medication 1000 UNIT(S): at 12:04

## 2024-03-19 RX ADMIN — Medication 3 MILLIGRAM(S): at 22:23

## 2024-03-19 RX ADMIN — CHLORHEXIDINE GLUCONATE 1 APPLICATION(S): 213 SOLUTION TOPICAL at 12:05

## 2024-03-19 RX ADMIN — ENOXAPARIN SODIUM 40 MILLIGRAM(S): 100 INJECTION SUBCUTANEOUS at 06:12

## 2024-03-19 NOTE — PROGRESS NOTE ADULT - SUBJECTIVE AND OBJECTIVE BOX
PROGRESS NOTE:      Patient is a 63y old  Male who presents with a chief complaint of Weakness (18 Mar 2024 08:03)      SUBJECTIVE / OVERNIGHT EVENTS:  No acute events overnight. MRI paperwork sent overnight.    ADDITIONAL REVIEW OF SYSTEMS:    MEDICATIONS  (STANDING):  ARIPiprazole 10 milliGRAM(s) Oral daily  chlorhexidine 4% Liquid 1 Application(s) Topical daily  cholecalciferol 1000 Unit(s) Oral daily  divalproex  milliGRAM(s) Oral two times a day  enoxaparin Injectable 40 milliGRAM(s) SubCutaneous every 24 hours  pantoprazole    Tablet 40 milliGRAM(s) Oral before breakfast  senna 2 Tablet(s) Oral at bedtime  simvastatin 10 milliGRAM(s) Oral at bedtime    MEDICATIONS  (PRN):  acetaminophen     Tablet .. 650 milliGRAM(s) Oral every 6 hours PRN Temp greater or equal to 38C (100.4F), Mild Pain (1 - 3)  aluminum hydroxide/magnesium hydroxide/simethicone Suspension 30 milliLiter(s) Oral every 4 hours PRN Dyspepsia  melatonin 3 milliGRAM(s) Oral at bedtime PRN Insomnia  ondansetron Injectable 4 milliGRAM(s) IV Push every 8 hours PRN Nausea and/or Vomiting      CAPILLARY BLOOD GLUCOSE        I&O's Summary    18 Mar 2024 07:01  -  19 Mar 2024 07:00  --------------------------------------------------------  IN: 780 mL / OUT: 2175 mL / NET: -1395 mL        PHYSICAL EXAM:  Vital Signs Last 24 Hrs  T(C): 36.7 (19 Mar 2024 06:41), Max: 37 (18 Mar 2024 12:00)  T(F): 98 (19 Mar 2024 06:41), Max: 98.6 (18 Mar 2024 12:00)  HR: 74 (19 Mar 2024 06:41) (74 - 90)  BP: 111/70 (19 Mar 2024 06:41) (111/66 - 120/83)  BP(mean): --  RR: 18 (19 Mar 2024 06:41) (17 - 18)  SpO2: 97% (19 Mar 2024 06:41) (97% - 97%)    Parameters below as of 19 Mar 2024 06:41  Patient On (Oxygen Delivery Method): room air      General: NAD, resting in bed, on RA  Lungs: clear to auscultation in anterior lung fields, no wheezes  Heart: +s1/s2, RRR, no murmurs/gallops/rubs  Abdomen: soft, non-distended, non-tender to palpation, no rigidity  Extremities: no peripheral edema bilaterally, moves both extremities  neuro: BLE w/ 4(barely)/5 strength at hip joint,    LABS:                      RADIOLOGY & ADDITIONAL TESTS:  Lab Results Reviewed   Imaging Reviewed  Electrocardiogram Reviewed   PROGRESS NOTE:      Patient is a 63y old  Male who presents with a chief complaint of Weakness (18 Mar 2024 08:03)      SUBJECTIVE / OVERNIGHT EVENTS:  No acute events overnight. MRI paperwork sent overnight.    ADDITIONAL REVIEW OF SYSTEMS:    MEDICATIONS  (STANDING):  ARIPiprazole 10 milliGRAM(s) Oral daily  chlorhexidine 4% Liquid 1 Application(s) Topical daily  cholecalciferol 1000 Unit(s) Oral daily  divalproex  milliGRAM(s) Oral two times a day  enoxaparin Injectable 40 milliGRAM(s) SubCutaneous every 24 hours  pantoprazole    Tablet 40 milliGRAM(s) Oral before breakfast  senna 2 Tablet(s) Oral at bedtime  simvastatin 10 milliGRAM(s) Oral at bedtime    MEDICATIONS  (PRN):  acetaminophen     Tablet .. 650 milliGRAM(s) Oral every 6 hours PRN Temp greater or equal to 38C (100.4F), Mild Pain (1 - 3)  aluminum hydroxide/magnesium hydroxide/simethicone Suspension 30 milliLiter(s) Oral every 4 hours PRN Dyspepsia  melatonin 3 milliGRAM(s) Oral at bedtime PRN Insomnia  ondansetron Injectable 4 milliGRAM(s) IV Push every 8 hours PRN Nausea and/or Vomiting      CAPILLARY BLOOD GLUCOSE        I&O's Summary    18 Mar 2024 07:01  -  19 Mar 2024 07:00  --------------------------------------------------------  IN: 780 mL / OUT: 2175 mL / NET: -1395 mL        PHYSICAL EXAM:  Vital Signs Last 24 Hrs  T(C): 36.7 (19 Mar 2024 06:41), Max: 37 (18 Mar 2024 12:00)  T(F): 98 (19 Mar 2024 06:41), Max: 98.6 (18 Mar 2024 12:00)  HR: 74 (19 Mar 2024 06:41) (74 - 90)  BP: 111/70 (19 Mar 2024 06:41) (111/66 - 120/83)  BP(mean): --  RR: 18 (19 Mar 2024 06:41) (17 - 18)  SpO2: 97% (19 Mar 2024 06:41) (97% - 97%)    Parameters below as of 19 Mar 2024 06:41  Patient On (Oxygen Delivery Method): room air      General: NAD, resting in bed, on RA  Lungs: clear to auscultation in anterior lung fields, no wheezes  Heart: +s1/s2, RRR, no murmurs/gallops/rubs  Abdomen: soft, non-distended, non-tender to palpation, no rigidity  Extremities: no peripheral edema bilaterally, moves both extremities  neuro: A&Ox3, BLE w/ 4(barely)/5 strength at hip joint,dorsi flexion 4/5 on left, 2-3/5 on right, patient unable to perform knee flexion or extension with knee contracted in flexion position.    LABS:                      RADIOLOGY & ADDITIONAL TESTS:  Lab Results Reviewed   Imaging Reviewed  Electrocardiogram Reviewed

## 2024-03-19 NOTE — PROGRESS NOTE ADULT - ATTENDING COMMENTS
63M pmh intellectual disability, seizure d/o, bipolar, gerd presents from group home for eval of persistent LE weakness resulting in limited mobility      LE weakness  - CT Cspine- no high grade central spinal stenosis.  - CT lumbarspine- no fracture or malalignment  - awaiting MRI lumbar spine to eval for spine cord lesion causing LE weakening -->skeletal survey was negative for any metal implants, pt is cleared for MRI.  - Will need neuro  for outpt EMG if he continues to have LE weakness.  - Pt needs PT reevaluation, he needs to walk up 6 flights of steps to be able to return home.

## 2024-03-19 NOTE — PROGRESS NOTE ADULT - PROBLEM SELECTOR PLAN 4
- DVT ppx: Lovenox SQ   - GI ppx: PPI   - Diet: Regular, thin liquid   - PT consult     Dispo : ANTOINETTE - DVT ppx: Lovenox SQ   - GI ppx: PPI   - Diet: Regular, thin liquid   - PT consulted, 3/19 re discussed to have pt have increased PT inpatient setting    Dispo : ANTOINETTE

## 2024-03-19 NOTE — PROGRESS NOTE ADULT - PROBLEM SELECTOR PLAN 1
Recent d/cd from [place] [facility]. Went back to his group home and noted to be very weak.  Ambulates w/ walker but is very unsteady, had several near fall episodes at the group home requiring full assistance  Patient is high risk for fall given weakness and is unsafe to stay at group home     - Review of labs and imaging unrevealing.  CK wnl, RVP/COVID neg.   - UA/UC: negative  - Likely component of debility in this frail older adult   - Fall precaution    - pending MRI lumbar /pending consent; 3/18 spoke Dr. Morales to clear pt for MRI need skeletal survey of arms and legs  - PT consult : ANTOINETTE rec  -per pt concerns will discuss SW/CM regarding home PT or outpt PT; spoke with tee manager of group home indicated that pt needs be able walk 6 stairs to come back to group home  - SW/ CM for safe dispo planning  - B12, Folate, TSH normal  - MRI lumbar imaging pending consent Recent d/cd from [place] [facility]. Went back to his group home and noted to be very weak.  Ambulates w/ walker but is very unsteady, had several near fall episodes at the group home requiring full assistance  Patient is high risk for fall given weakness and is unsafe to stay at group home     - Review of labs and imaging unrevealing.  CK wnl, RVP/COVID neg.   - UA/UC: negative  - Likely component of debility in this frail older adult   - Fall precaution    - pending MRI lumbar /pending consent; 3/18 spoke Dr. Morales to clear pt for MRI need skeletal survey of arms and legs,  - 3/19 pending proper paper work for MRI   - PT consult : ANTOINETTE rec  -per pt concerns will discuss SW/CM regarding home PT or outpt PT; spoke with tee manager of group home indicated that pt needs be able walk 6 stairs to come back to group home  - SW/ CM for safe dispo planning  - B12, Folate, TSH normal  - MRI lumbar imaging pending consent

## 2024-03-20 PROCEDURE — 99232 SBSQ HOSP IP/OBS MODERATE 35: CPT | Mod: GC

## 2024-03-20 RX ADMIN — SIMVASTATIN 10 MILLIGRAM(S): 20 TABLET, FILM COATED ORAL at 21:44

## 2024-03-20 RX ADMIN — ENOXAPARIN SODIUM 40 MILLIGRAM(S): 100 INJECTION SUBCUTANEOUS at 05:29

## 2024-03-20 RX ADMIN — Medication 1000 UNIT(S): at 12:24

## 2024-03-20 RX ADMIN — DIVALPROEX SODIUM 500 MILLIGRAM(S): 500 TABLET, DELAYED RELEASE ORAL at 05:29

## 2024-03-20 RX ADMIN — ARIPIPRAZOLE 10 MILLIGRAM(S): 15 TABLET ORAL at 12:24

## 2024-03-20 RX ADMIN — CHLORHEXIDINE GLUCONATE 1 APPLICATION(S): 213 SOLUTION TOPICAL at 12:24

## 2024-03-20 RX ADMIN — PANTOPRAZOLE SODIUM 40 MILLIGRAM(S): 20 TABLET, DELAYED RELEASE ORAL at 05:30

## 2024-03-20 RX ADMIN — SENNA PLUS 2 TABLET(S): 8.6 TABLET ORAL at 21:44

## 2024-03-20 RX ADMIN — DIVALPROEX SODIUM 500 MILLIGRAM(S): 500 TABLET, DELAYED RELEASE ORAL at 17:18

## 2024-03-20 NOTE — PROGRESS NOTE ADULT - SUBJECTIVE AND OBJECTIVE BOX
***************************************************************  Romero (Ji-Cheng) [facility] PGY2  Internal Medicine   ***************************************************************    NEIDA ALCARAZ  63y  MRN: [id number]    Patient is a 63y old  Male who presents with a chief complaint of Weakness (19 Mar 2024 07:04)      Subjective: no events ON. Denies fever, CP, SOB, abn pain, N/V, dysuria. Tolerating diet.      MEDICATIONS  (STANDING):  ARIPiprazole 10 milliGRAM(s) Oral daily  chlorhexidine 4% Liquid 1 Application(s) Topical daily  cholecalciferol 1000 Unit(s) Oral daily  divalproex  milliGRAM(s) Oral two times a day  enoxaparin Injectable 40 milliGRAM(s) SubCutaneous every 24 hours  pantoprazole    Tablet 40 milliGRAM(s) Oral before breakfast  senna 2 Tablet(s) Oral at bedtime  simvastatin 10 milliGRAM(s) Oral at bedtime    MEDICATIONS  (PRN):  acetaminophen     Tablet .. 650 milliGRAM(s) Oral every 6 hours PRN Temp greater or equal to 38C (100.4F), Mild Pain (1 - 3)  aluminum hydroxide/magnesium hydroxide/simethicone Suspension 30 milliLiter(s) Oral every 4 hours PRN Dyspepsia  melatonin 3 milliGRAM(s) Oral at bedtime PRN Insomnia  ondansetron Injectable 4 milliGRAM(s) IV Push every 8 hours PRN Nausea and/or Vomiting      Objective:    Vitals: Vital Signs Last 24 Hrs  T(C): 36.7 (03-20-24 @ 06:30), Max: 36.8 (03-19-24 @ 13:37)  T(F): 98.1 (03-20-24 @ 06:30), Max: 98.3 (03-19-24 @ 13:37)  HR: 85 (03-20-24 @ 06:30) (81 - 96)  BP: 111/76 (03-20-24 @ 06:30) (110/73 - 122/76)  BP(mean): --  RR: 18 (03-20-24 @ 06:30) (18 - 18)  SpO2: 98% (03-20-24 @ 06:30) (98% - 98%)            I&O's Summary    19 Mar 2024 07:01  -  20 Mar 2024 07:00  --------------------------------------------------------  IN: 0 mL / OUT: 1800 mL / NET: -1800 mL        PHYSICAL EXAM:  GENERAL: NAD  HEAD:  Atraumatic, Normocephalic  EYES: EOMI, conjunctiva and sclera clear  CHEST/LUNG: Clear to auscultation bilaterally; No rales, rhonchi, wheezing, or rubs  HEART: Regular rate and rhythm; No murmurs, rubs, or gallops  ABDOMEN: Soft, Nontender, Nondistended;   SKIN: No rashes or lesions  NERVOUS SYSTEM:  Alert & Oriented X3, no focal deficits    LABS:                              CAPILLARY BLOOD GLUCOSE          RADIOLOGY & ADDITIONAL TESTS:    Imaging Personally Reviewed:  [x ] YES  [ ] NO    Consultants involved in case:   Consultant(s) Notes Reviewed:  [ x] YES  [ ] NO:   Care Discussed with Consultants/Other Providers [x ] YES  [ ] NO         ***************************************************************  Romero (Ji-Cheng) [facility] PGY2  Internal Medicine   ***************************************************************    NEIDA ALCARAZ  63y  MRN: [id number]    Patient is a 63y old  Male who presents with a chief complaint of Weakness (19 Mar 2024 07:04)      Subjective: NAEO. No acute complaints. MRI completed.     MEDICATIONS  (STANDING):  ARIPiprazole 10 milliGRAM(s) Oral daily  chlorhexidine 4% Liquid 1 Application(s) Topical daily  cholecalciferol 1000 Unit(s) Oral daily  divalproex  milliGRAM(s) Oral two times a day  enoxaparin Injectable 40 milliGRAM(s) SubCutaneous every 24 hours  pantoprazole    Tablet 40 milliGRAM(s) Oral before breakfast  senna 2 Tablet(s) Oral at bedtime  simvastatin 10 milliGRAM(s) Oral at bedtime    MEDICATIONS  (PRN):  acetaminophen     Tablet .. 650 milliGRAM(s) Oral every 6 hours PRN Temp greater or equal to 38C (100.4F), Mild Pain (1 - 3)  aluminum hydroxide/magnesium hydroxide/simethicone Suspension 30 milliLiter(s) Oral every 4 hours PRN Dyspepsia  melatonin 3 milliGRAM(s) Oral at bedtime PRN Insomnia  ondansetron Injectable 4 milliGRAM(s) IV Push every 8 hours PRN Nausea and/or Vomiting      Objective:    Vitals: Vital Signs Last 24 Hrs  T(C): 36.7 (03-20-24 @ 06:30), Max: 36.8 (03-19-24 @ 13:37)  T(F): 98.1 (03-20-24 @ 06:30), Max: 98.3 (03-19-24 @ 13:37)  HR: 85 (03-20-24 @ 06:30) (81 - 96)  BP: 111/76 (03-20-24 @ 06:30) (110/73 - 122/76)  BP(mean): --  RR: 18 (03-20-24 @ 06:30) (18 - 18)  SpO2: 98% (03-20-24 @ 06:30) (98% - 98%)            I&O's Summary    19 Mar 2024 07:01  -  20 Mar 2024 07:00  --------------------------------------------------------  IN: 0 mL / OUT: 1800 mL / NET: -1800 mL        PHYSICAL EXAM:  GENERAL: NAD  HEAD:  Atraumatic, Normocephalic  EYES: EOMI, conjunctiva and sclera clear  CHEST/LUNG: Clear to auscultation bilaterally; No rales, rhonchi, wheezing, or rubs  HEART: Regular rate and rhythm; No murmurs, rubs, or gallops  ABDOMEN: Soft, Nontender, Nondistended;   SKIN: No rashes or lesions  NERVOUS SYSTEM:  Alert & Oriented X3, no focal deficits    LABS:                              CAPILLARY BLOOD GLUCOSE          RADIOLOGY & ADDITIONAL TESTS:    Imaging Personally Reviewed:  [x ] YES  [ ] NO    Consultants involved in case:   Consultant(s) Notes Reviewed:  [ x] YES  [ ] NO:   Care Discussed with Consultants/Other Providers [x ] YES  [ ] NO

## 2024-03-20 NOTE — PROGRESS NOTE ADULT - ATTENDING COMMENTS
63M pmh intellectual disability, seizure d/o, bipolar, gerd presents from group home for eval of persistent LE weakness resulting in limited mobility      LE weakness  - CT Cspine- no high grade central spinal stenosis.  - CT lumbarspine- no fracture or malalignment  - MR lumbar spine--> No acute fracture or suspected metastatic disease. Small hemangiomas at L1 and L3. L1-2 far left lateral small disc protrusion.  L2-3 small amount of enhancing fluid in the facet joints, right greater  than left.  L3-4 suspected small far left lateral disc protrusion with annular tear L4-5 broad-based disc bulge with posterior annular tear. Moderate canal  stenosis. Lateral recess stenosis bilaterally. Small amount of bilateral enhancing fluid in the facet joints. L5-S1 Central disc protrusion abutting the descending left S1 nerve root in the lateral recess.  - will get spine eval  - PT as tolerated

## 2024-03-20 NOTE — PROGRESS NOTE ADULT - PROBLEM SELECTOR PLAN 4
- DVT ppx: Lovenox SQ   - GI ppx: PPI   - Diet: Regular, thin liquid   - PT consulted, 3/19 re discussed to have pt have increased PT inpatient setting    Dispo : ANTOINETTE

## 2024-03-20 NOTE — PROGRESS NOTE ADULT - PROBLEM SELECTOR PLAN 1
Recent d/cd from [facility]. Went back to his group home and noted to be very weak.  Ambulates w/ walker but is very unsteady, had several near fall episodes at the group home requiring full assistance  Patient is high risk for fall given weakness and is unsafe to stay at group home     - Review of labs and imaging unrevealing.  CK wnl, RVP/COVID neg.   - UA/UC: negative  - Likely component of debility in this frail older adult   - Fall precaution    - pending MRI lumbar /pending consent; 3/18 spoke Dr. Morales to clear pt for MRI need skeletal survey of arms and legs,  - 3/19 pending proper paper work for MRI   - PT consult : ANTOINETTE rec  -per pt concerns will discuss SW/CM regarding home PT or outpt PT; spoke with tee manager of group home indicated that pt needs be able walk 6 stairs to come back to group home  - SW/ CM for safe dispo planning  - B12, Folate, TSH normal  - MRI lumbar imaging pending consent [facility] Went back to his group home and noted to be very weak.  Ambulates w/ walker but is very unsteady, had several near fall episodes at the group home requiring full assistance  Patient is high risk for fall given weakness and is unsafe to stay at group home     - Review of labs and imaging unrevealing.  CK wnl, RVP/COVID neg.   - UA/UC: negative  - Likely component of debility in this frail older adult   - Fall precaution    - pending MRI lumbar /pending consent; 3/18 spoke Dr. Morales to clear pt for MRI need skeletal survey of arms and legs,  - 3/19 pending proper paper work for MRI   - PT consult : ANTOINETTE rec  -per pt concerns will discuss SW/CM regarding home PT or outpt PT; spoke with tee manager of group home indicated that pt needs be able walk 6 stairs to come back to group home  - SW/ CM for safe dispo planning  - B12, Folate, TSH normal  - MRI lumbar imaging completed, plan for spine consultation re: findings.

## 2024-03-21 PROCEDURE — 99232 SBSQ HOSP IP/OBS MODERATE 35: CPT | Mod: GC

## 2024-03-21 PROCEDURE — 72141 MRI NECK SPINE W/O DYE: CPT | Mod: 26

## 2024-03-21 PROCEDURE — 72146 MRI CHEST SPINE W/O DYE: CPT | Mod: 26

## 2024-03-21 RX ADMIN — ENOXAPARIN SODIUM 40 MILLIGRAM(S): 100 INJECTION SUBCUTANEOUS at 06:48

## 2024-03-21 RX ADMIN — ARIPIPRAZOLE 10 MILLIGRAM(S): 15 TABLET ORAL at 13:17

## 2024-03-21 RX ADMIN — SIMVASTATIN 10 MILLIGRAM(S): 20 TABLET, FILM COATED ORAL at 22:33

## 2024-03-21 RX ADMIN — CHLORHEXIDINE GLUCONATE 1 APPLICATION(S): 213 SOLUTION TOPICAL at 13:18

## 2024-03-21 RX ADMIN — DIVALPROEX SODIUM 500 MILLIGRAM(S): 500 TABLET, DELAYED RELEASE ORAL at 18:16

## 2024-03-21 RX ADMIN — PANTOPRAZOLE SODIUM 40 MILLIGRAM(S): 20 TABLET, DELAYED RELEASE ORAL at 06:49

## 2024-03-21 RX ADMIN — DIVALPROEX SODIUM 500 MILLIGRAM(S): 500 TABLET, DELAYED RELEASE ORAL at 06:49

## 2024-03-21 RX ADMIN — SENNA PLUS 2 TABLET(S): 8.6 TABLET ORAL at 22:33

## 2024-03-21 RX ADMIN — Medication 1000 UNIT(S): at 13:18

## 2024-03-21 NOTE — CONSULT NOTE ADULT - SUBJECTIVE AND OBJECTIVE BOX
Patient is a 63yMale ambulator with walker who was admitted due unsteady gait. Pt has a histoy of CP, intellectual disability, seizures, and bipolar disorider. Pt was noted to have more unsteady gait in the past several days. History and exam limited due to intellectual disability.  At this time, pt denies any lower back pain or radiation down BLLE. Denies recent falls. Denies pain down legs, denies numbness/tingling/paresthesias/weakness, denies incontinence of bowel/bladder.  Denies having any other pain elsewhere. Denies any previous orthopaedic history. No other orthopaedic concerns at this time.       PAST MEDICAL & SURGICAL HISTORY:  MR (mental retardation)      Cerebral palsy      H/O gingivitis      H/O cerebral palsy      Seizure      Diverticulosis      No significant past surgical history      No significant past surgical history          Vital Signs Last 24 Hrs  T(C): 36.6 (20 Mar 2024 20:42), Max: 37 (20 Mar 2024 12:00)  T(F): 97.8 (20 Mar 2024 20:42), Max: 98.6 (20 Mar 2024 12:00)  HR: 83 (20 Mar 2024 20:42) (83 - 90)  BP: 109/74 (20 Mar 2024 20:42) (96/80 - 111/76)  BP(mean): --  RR: 17 (20 Mar 2024 20:42) (17 - 18)  SpO2: 97% (20 Mar 2024 20:42) (97% - 98%)    Parameters below as of 20 Mar 2024 20:42  Patient On (Oxygen Delivery Method): room air      I&O's Detail    19 Mar 2024 07:01  -  20 Mar 2024 07:00  --------------------------------------------------------  IN:  Total IN: 0 mL    OUT:    Incontinent per Condom Catheter (mL): 1800 mL  Total OUT: 1800 mL    Total NET: -1800 mL      20 Mar 2024 07:01  -  21 Mar 2024 02:28  --------------------------------------------------------  IN:    Oral Fluid: 1120 mL  Total IN: 1120 mL    OUT:    Incontinent per Condom Catheter (mL): 1450 mL  Total OUT: 1450 mL    Total NET: -330 mL        PHYSICAL EXAM:  General; Awake and alert, Oriented x 3  Spine: No TTP over spinous processes or paraspinal muscles at C/T/L spine. No palpable step off.     No pain to passive SLR    Unable to fully participate in physical exams due to intellectual disability  Grossly moving all 4 extremities with good strength  SILT throughout BLLE and BLUE    Negative Bertrand  Negative Clonus   Negative Babinski    Compartments soft and compressible    Secondary Assessment:  NC/AT, NTTP of clavicles, NTTP of Pelvis  LUE: NTTP of Shoulder, Elbow, Wrist, Hand; NT with AROM/PROM of Shoulder, Elbow, Wrist, Hand; AIN/PIN/Med/Uln/Msc/Rad/Ax intact  RUE: NTTP of Shoulder, Elbow, Wrist, Hand; NT with AROM/PROM of Shoulder, Elbow, Wrist, Hand; AIN/PIN/Med/Uln/Msc/Rad/Ax intact  LLE: Able to SLR, NT with Log Roll, NT with Heel Strike, NTTP of Hip, Knee, Ankle, foot; NT with AROM/PROM of Hip, Knee, Ankle, footQ/H/Gsc/TA/EHL/FHL intact  RLE: Able to SLR, NT with Log Roll, NT with Heel Strike, NTTP of Hip, Knee, Ankle, foot; NT with AROM/PROM of Hip, Knee, Ankle, footQ/H/Gsc/TA/EHL/FHL intact    Imaging:  MR lumbar:   No acute fracture or suspected metastatic disease.    Small hemangiomas at L1 and L3.    L1-2 far left lateral small disc protrusion.    L2-3 small amount of enhancing fluid in the facet joints, right greater   than left.    L3-4 suspected small far left lateral disc protrusion with annular tear.    L4-5 broad-based disc bulge with posterior annular tear. Moderate canal   stenosis. Lateral recess stenosis bilaterally. Small amount of bilateral   enhancing fluid in the facet joints.    L5-S1 Central disc protrusion abutting the descending left S1 nerve root   in the lateral recess.                                          A/P :   Patient is a 63yMale w/ L5-S1 HNP    -Clinical presentation and physical exam are not consistent w/ acute cord compression/cauda equina. Does not demonstrate red flag symptoms such as bowel/bladder incontinence, saddle anesthesia, fevers/chills, or weight loss    -Recommend weight loss/core strengthening for improved back health  -WBAT  -No heavy lifting/twisting  -Multimodal analgesia - recommend low dose opioids, acetaminophen, muscle relaxant as tolerated  -DVT ppx and medical recommendations per primary team  -No acute orthopedic surgical interventions at this time  -Recommend follow up w/ Dr. Baljinder Sapp, outpatient in 1-2 weeks. Call office to schedule appointment.  -All patient's questions answered. Patient understands and agrees w/ above plan.  -Will discuss w/ attending and advise if plan changes.    For all questions, please reach out via the following numbers for the on-call resident; do not reach out via Teams.  [facility] [phone]  [facility]        [id number]  [facility]  [phone]/ [phone]

## 2024-03-21 NOTE — PROGRESS NOTE ADULT - SUBJECTIVE AND OBJECTIVE BOX
***************************************************************  Romero (Ji-Cheng) [facility] PGY2  Internal Medicine   ***************************************************************    NEIDA ALCARAZ  63y  MRN: [id number]    Patient is a 63y old  Male who presents with a chief complaint of Weakness (21 Mar 2024 02:22)      Subjective: no events ON. Denies fever, CP, SOB, abn pain, N/V, dysuria. Tolerating diet.      MEDICATIONS  (STANDING):  ARIPiprazole 10 milliGRAM(s) Oral daily  chlorhexidine 4% Liquid 1 Application(s) Topical daily  cholecalciferol 1000 Unit(s) Oral daily  divalproex  milliGRAM(s) Oral two times a day  enoxaparin Injectable 40 milliGRAM(s) SubCutaneous every 24 hours  pantoprazole    Tablet 40 milliGRAM(s) Oral before breakfast  senna 2 Tablet(s) Oral at bedtime  simvastatin 10 milliGRAM(s) Oral at bedtime    MEDICATIONS  (PRN):  acetaminophen     Tablet .. 650 milliGRAM(s) Oral every 6 hours PRN Temp greater or equal to 38C (100.4F), Mild Pain (1 - 3)  aluminum hydroxide/magnesium hydroxide/simethicone Suspension 30 milliLiter(s) Oral every 4 hours PRN Dyspepsia  melatonin 3 milliGRAM(s) Oral at bedtime PRN Insomnia  ondansetron Injectable 4 milliGRAM(s) IV Push every 8 hours PRN Nausea and/or Vomiting      Objective:    Vitals: Vital Signs Last 24 Hrs  T(C): 36.6 (03-20-24 @ 20:42), Max: 37 (03-20-24 @ 12:00)  T(F): 97.8 (03-20-24 @ 20:42), Max: 98.6 (03-20-24 @ 12:00)  HR: 83 (03-20-24 @ 20:42) (83 - 90)  BP: 109/74 (03-20-24 @ 20:42) (96/80 - 111/76)  BP(mean): --  RR: 17 (03-20-24 @ 20:42) (17 - 18)  SpO2: 97% (03-20-24 @ 20:42) (97% - 98%)            I&O's Summary    19 Mar 2024 07:01  -  20 Mar 2024 07:00  --------------------------------------------------------  IN: 0 mL / OUT: 1800 mL / NET: -1800 mL    20 Mar 2024 07:01  -  21 Mar 2024 05:34  --------------------------------------------------------  IN: 1120 mL / OUT: 1450 mL / NET: -330 mL        PHYSICAL EXAM:  GENERAL: NAD  HEAD:  Atraumatic, Normocephalic  EYES: EOMI, conjunctiva and sclera clear  CHEST/LUNG: Clear to auscultation bilaterally; No rales, rhonchi, wheezing, or rubs  HEART: Regular rate and rhythm; No murmurs, rubs, or gallops  ABDOMEN: Soft, Nontender, Nondistended;   SKIN: No rashes or lesions  NERVOUS SYSTEM:  Alert & Oriented X3, no focal deficits    LABS:                              CAPILLARY BLOOD GLUCOSE          RADIOLOGY & ADDITIONAL TESTS:    Imaging Personally Reviewed:  [x ] YES  [ ] NO    Consultants involved in case:   Consultant(s) Notes Reviewed:  [ x] YES  [ ] NO:   Care Discussed with Consultants/Other Providers [x ] YES  [ ] NO

## 2024-03-21 NOTE — PROGRESS NOTE ADULT - ATTENDING COMMENTS
63M pmh intellectual disability, seizure d/o, bipolar, gerd presents from group home for eval of persistent LE weakness resulting in limited mobility      LE weakness  - CT Cspine- no high grade central spinal stenosis.  - CT lumbar-spine- no fracture or malalignment  - MR lumbar spine--> No acute fracture or suspected metastatic disease. Small hemangiomas at L1 and L3. L1-2 far left lateral small disc protrusion.  L2-3 small amount of enhancing fluid in the facet joints, right greater  than left.  L3-4 suspected small far left lateral disc protrusion with annular tear L4-5 broad-based disc bulge with posterior annular tear. Moderate canal  stenosis. Lateral recess stenosis bilaterally. Small amount of bilateral enhancing fluid in the facet joints. L5-S1 Central disc protrusion abutting the descending left S1 nerve root in the lateral recess.  - Spine eval appreciated --> PT was recommended no need for any inpatient treatments. Recommend follow up w/ Dr. Baljinder Sapp, outpatient  - PT as tolerated    d/c planning

## 2024-03-21 NOTE — PROGRESS NOTE ADULT - PROBLEM SELECTOR PLAN 1
Recent d/cd from [place] [facility]. Went back to his group home and noted to be very weak.  Ambulates w/ walker but is very unsteady, had several near fall episodes at the group home requiring full assistance  Patient is high risk for fall given weakness and is unsafe to stay at group home     - Review of labs and imaging unrevealing.  CK wnl, RVP/COVID neg.   - UA/UC: negative  - Likely component of debility in this frail older adult   - Fall precaution    - pending MRI lumbar /pending consent; 3/18 spoke Dr. Morales to clear pt for MRI need skeletal survey of arms and legs,  - 3/19 pending proper paper work for MRI   - PT consult : ANTOINETTE rec  -per pt concerns will discuss SW/CM regarding home PT or outpt PT; spoke with tee manager of group home indicated that pt needs be able walk 6 stairs to come back to group home  - SW/ CM for safe dispo planning  - B12, Folate, TSH normal  - MRI lumbar imaging completed, plan for spine consultation re: findings.

## 2024-03-22 ENCOUNTER — TRANSCRIPTION ENCOUNTER (OUTPATIENT)
Age: 64
End: 2024-03-22

## 2024-03-22 PROCEDURE — 99232 SBSQ HOSP IP/OBS MODERATE 35: CPT | Mod: GC

## 2024-03-22 RX ADMIN — CHLORHEXIDINE GLUCONATE 1 APPLICATION(S): 213 SOLUTION TOPICAL at 12:10

## 2024-03-22 RX ADMIN — SIMVASTATIN 10 MILLIGRAM(S): 20 TABLET, FILM COATED ORAL at 21:17

## 2024-03-22 RX ADMIN — DIVALPROEX SODIUM 500 MILLIGRAM(S): 500 TABLET, DELAYED RELEASE ORAL at 17:27

## 2024-03-22 RX ADMIN — Medication 3 MILLIGRAM(S): at 21:16

## 2024-03-22 RX ADMIN — PANTOPRAZOLE SODIUM 40 MILLIGRAM(S): 20 TABLET, DELAYED RELEASE ORAL at 06:31

## 2024-03-22 RX ADMIN — ARIPIPRAZOLE 10 MILLIGRAM(S): 15 TABLET ORAL at 12:09

## 2024-03-22 RX ADMIN — DIVALPROEX SODIUM 500 MILLIGRAM(S): 500 TABLET, DELAYED RELEASE ORAL at 06:30

## 2024-03-22 RX ADMIN — SENNA PLUS 2 TABLET(S): 8.6 TABLET ORAL at 21:17

## 2024-03-22 RX ADMIN — ENOXAPARIN SODIUM 40 MILLIGRAM(S): 100 INJECTION SUBCUTANEOUS at 06:30

## 2024-03-22 RX ADMIN — Medication 1000 UNIT(S): at 12:10

## 2024-03-22 NOTE — DISCHARGE NOTE NURSING/CASE MANAGEMENT/SOCIAL WORK - NSDCFUADDAPPT_GEN_ALL_CORE_FT
APPTS ARE READY TO BE MADE: [ X] YES    Best Family or Patient Contact (if needed): Group home Nya: [phone]    Additional Information about above appointments (if needed):    1: F/u PCP within 2 weeks regarding hospital stay  2:   3:     Other comments or requests:

## 2024-03-22 NOTE — PROGRESS NOTE ADULT - ATTENDING COMMENTS
63M pmh intellectual disability, seizure d/o, bipolar, gerd presents from group home for eval of persistent LE weakness resulting in limited mobility      LE weakness  - CT Cspine- no high grade central spinal stenosis.  - CT lumbar-spine- no fracture or malalignment  - MR lumbar spine--> No acute fracture or suspected metastatic disease. Small hemangiomas at L1 and L3. L1-2 far left lateral small disc protrusion.  L2-3 small amount of enhancing fluid in the facet joints, right greater  than left.  L3-4 suspected small far left lateral disc protrusion with annular tear L4-5 broad-based disc bulge with posterior annular tear. Moderate canal  stenosis. Lateral recess stenosis bilaterally. Small amount of bilateral enhancing fluid in the facet joints. L5-S1 Central disc protrusion abutting the descending left S1 nerve root in the lateral recess.  - Spine eval appreciated --> PT was recommended no need for any inpatient treatments. Recommend follow up w/ Dr. Baljinder Sapp, outpatient  - PT as tolerated    D/C planning to [facility]. Pt needs a level 2 screen for placement to [facility].

## 2024-03-22 NOTE — PROGRESS NOTE ADULT - PROBLEM SELECTOR PLAN 1
Recent d/cd from [place] re[facility]. Went back to his group home and noted to be very weak.  Ambulates w/ walker but is very unsteady, had several near fall episodes at the group home requiring full assistance  Patient is high risk for fall given weakness and is unsafe to stay at group home     - Review of labs and imaging unrevealing.  CK wnl, RVP/COVID neg.   - UA/UC: negative  - Likely component of debility in this frail older adult   - Fall precaution    - MRI lumbar/ skeletal survey done  - PT consult : ANTOINETTE rec  -per pt concerns will discuss SW/CM regarding home PT or outpt PT; spoke with tee manager of group home indicated that pt needs be able walk 6 stairs to come back to group home  - SW/ CM for safe dispo planning  - B12, Folate, TSH normal  - MRI lumbar imaging completed, plan for spine consultation re: findings.

## 2024-03-22 NOTE — PROGRESS NOTE ADULT - SUBJECTIVE AND OBJECTIVE BOX
Patient is a 63y old  Male who presents with a chief complaint of Weakness (21 Mar 2024 05:33)    INTERVAL HPI/OVERNIGHT EVENTS: No overnight events. Pt w/o any complaints. Denies any cp, sob, abd pain. W/o any hematuria, dysuria, melenea, or hematochezia. Tolerating diet well.    No Known Allergies    MEDS:  acetaminophen     Tablet .. 650 milliGRAM(s) Oral every 6 hours PRN  aluminum hydroxide/magnesium hydroxide/simethicone Suspension 30 milliLiter(s) Oral every 4 hours PRN  ARIPiprazole 10 milliGRAM(s) Oral daily  chlorhexidine 4% Liquid 1 Application(s) Topical daily  cholecalciferol 1000 Unit(s) Oral daily  divalproex  milliGRAM(s) Oral two times a day  enoxaparin Injectable 40 milliGRAM(s) SubCutaneous every 24 hours  melatonin 3 milliGRAM(s) Oral at bedtime PRN  ondansetron Injectable 4 milliGRAM(s) IV Push every 8 hours PRN  pantoprazole    Tablet 40 milliGRAM(s) Oral before breakfast  senna 2 Tablet(s) Oral at bedtime  simvastatin 10 milliGRAM(s) Oral at bedtime    T(C): 36.6 (03-22-24 @ 06:47), Max: 36.8 (03-21-24 @ 20:28)  HR: 77 (03-22-24 @ 06:47) (77 - 86)  BP: 103/73 (03-22-24 @ 06:47) (103/73 - 118/67)  RR: 16 (03-22-24 @ 06:47) (16 - 18)  SpO2: 98% (03-22-24 @ 06:47) (95% - 98%)  Wt(kg): --    PHYSICAL EXAM:  GENERAL: NAD  HEAD:  Atraumatic, Normocephalic  EYES: EOMI, conjunctiva and sclera clear  CHEST/LUNG: Clear to auscultation bilaterally; No rales, rhonchi, wheezing, or rubs  HEART: Regular rate and rhythm; No murmurs, rubs, or gallops  ABDOMEN: Soft, Nontender, Nondistended;   SKIN: No rashes or lesions  NERVOUS SYSTEM:  Alert & Oriented X3, no focal deficits    Consultant(s) Notes Reviewed:  [x ] YES  [ ] NO  Care Discussed with Consultants/Other Providers [ x] YES  [ ] NO    MR Lumbar Spine w/wo IV Cont (03.19.24 @ 17:53)  IMPRESSION:  No acute fracture or suspected metastatic disease.  Small hemangiomas at L1 and L3.  L1-2 far left lateral small disc protrusion.  L2-3 small amount of enhancing fluid in the facet joints, right greater than left.  L3-4 suspected small far left lateral disc protrusion with annular tear.  L4-5 broad-based disc bulge with posterior annular tear. Moderate canal   stenosis. Lateral recess stenosis bilaterally. Small amount of bilateral   enhancing fluid in the facet joints.  L5-S1 Central disc protrusion abutting the descending left S1 nerve root in the lateral recess.    Xray Skeletal Survey, Limited (03.18.24 @ 17:53)  IMPRESSION:  IV cannula with attached tubing in left antecubital region. Otherwise no   metallic hardware implants devices or other objects/material in imaged   fields of view.    Intact normally aligned imaged osteoarticular structures with preserved   joint spaces.    Slightly osteopenic appearing osseous structures. Appearance of bone   island in lateral right femoral head neck junction region.

## 2024-03-22 NOTE — DISCHARGE NOTE NURSING/CASE MANAGEMENT/SOCIAL WORK - PATIENT PORTAL LINK FT
You can access the FollowMyHealth Patient Portal offered by [facility] by registering at the following website: [email or web address]. By joining [facility]’s FollowMyHealth portal, you will also be able to view your health information using other applications (apps) compatible with our system. no

## 2024-03-23 PROCEDURE — 99232 SBSQ HOSP IP/OBS MODERATE 35: CPT | Mod: GC

## 2024-03-23 RX ADMIN — PANTOPRAZOLE SODIUM 40 MILLIGRAM(S): 20 TABLET, DELAYED RELEASE ORAL at 05:02

## 2024-03-23 RX ADMIN — DIVALPROEX SODIUM 500 MILLIGRAM(S): 500 TABLET, DELAYED RELEASE ORAL at 17:04

## 2024-03-23 RX ADMIN — SENNA PLUS 2 TABLET(S): 8.6 TABLET ORAL at 21:11

## 2024-03-23 RX ADMIN — DIVALPROEX SODIUM 500 MILLIGRAM(S): 500 TABLET, DELAYED RELEASE ORAL at 05:03

## 2024-03-23 RX ADMIN — SIMVASTATIN 10 MILLIGRAM(S): 20 TABLET, FILM COATED ORAL at 21:11

## 2024-03-23 RX ADMIN — ARIPIPRAZOLE 10 MILLIGRAM(S): 15 TABLET ORAL at 09:52

## 2024-03-23 RX ADMIN — CHLORHEXIDINE GLUCONATE 1 APPLICATION(S): 213 SOLUTION TOPICAL at 09:46

## 2024-03-23 RX ADMIN — Medication 1000 UNIT(S): at 09:52

## 2024-03-23 RX ADMIN — Medication 3 MILLIGRAM(S): at 21:11

## 2024-03-23 RX ADMIN — ENOXAPARIN SODIUM 40 MILLIGRAM(S): 100 INJECTION SUBCUTANEOUS at 05:02

## 2024-03-23 NOTE — PROGRESS NOTE ADULT - ATTENDING COMMENTS
63M PMH intellectual disability, seizure d/o, bipolar, gerd presents from group home for eval of persistent LE weakness resulting in limited mobility    LE weakness  - CT Cspine- no high grade central spinal stenosis.  - CT lumbar-spine- no fracture or malalignment  - MR lumbar spine--> No acute fracture or suspected metastatic disease. Small hemangiomas at L1 and L3. L1-2 far left lateral small disc protrusion. L2-3 small amount of enhancing fluid in the facet joints, right greater  than left.  L3-4 suspected small far left lateral disc protrusion with annular tear L4-5 broad-based disc bulge with posterior annular tear. Moderate canal  stenosis. Lateral recess stenosis bilaterally. Small amount of bilateral enhancing fluid in the facet joints. L5-S1 Central disc protrusion abutting the descending left S1 nerve root in the lateral recess.  - Spine eval appreciated --> PT was recommended no need for any inpatient treatments. Recommend follow up w/ Dr. Baljinder Sapp, outpatient  - PT as tolerated  - MR C and T spine---> No high-grade spinal stenosis in the cervical or thoracic spine. There is mild narrowing of the spinal canal at C6-7.      D/C planning to [facility]. Pt needs a level 2 screen for placement to [facility].

## 2024-03-23 NOTE — PROGRESS NOTE ADULT - PROBLEM SELECTOR PLAN 1
Recent d/cd from [facility]. Went back to his group home and noted to be very weak.  Ambulates w/ walker but is very unsteady, had several near fall episodes at the group home requiring full assistance  Patient is high risk for fall given weakness and is unsafe to stay at group home     - Review of labs and imaging unrevealing.  CK wnl, RVP/COVID neg.   - UA/UC: negative  - Likely component of debility in this frail older adult   - Fall precaution    - MRI lumbar/ skeletal survey done  - PT consult : [facility] rec  -per pt concerns will discuss SW/CM regarding home PT or outpt PT; spoke with tee manager of group home indicated that pt needs be able walk 6 stairs to come back to group home  - SW/ CM for safe dispo planning  - B12, Folate, TSH normal  - MRI lumbar imaging completed, plan for spine consultation re: findings. Recent d/cd from [place] [facility]. Went back to his group home and noted to be very weak.  Ambulates w/ walker but is very unsteady, had several near fall episodes at the group home requiring full assistance  Patient is high risk for fall given weakness and is unsafe to stay at group home     - Review of labs and imaging unrevealing.  CK wnl, RVP/COVID neg.   - UA/UC: negative  - Likely component of debility in this frail older adult   - Fall precaution    - MRI lumbar/ skeletal survey done  - PT consult : [facility] rec  -per pt concerns will discuss SW/CM regarding home PT or outpt PT; spoke with tee manager of group home indicated that pt needs be able walk 6 stairs to come back to group home  - SW/ CM for safe dispo planning  - B12, Folate, TSH normal  - MRI lumbar imaging completed, plan for spine consultation re: findings - no surgical intervention

## 2024-03-23 NOTE — PROGRESS NOTE ADULT - SUBJECTIVE AND OBJECTIVE BOX
***************************************************************  Romero (Ji-Cheng) [facility] PGY2  Internal Medicine   ***************************************************************    NEIDA ALCARAZ  63y  MRN: [id number]    Patient is a 63y old  Male who presents with a chief complaint of Weakness (22 Mar 2024 08:07)      Subjective: no events ON. Denies fever, CP, SOB, abn pain, N/V, dysuria. Tolerating diet.      MEDICATIONS  (STANDING):  ARIPiprazole 10 milliGRAM(s) Oral daily  chlorhexidine 4% Liquid 1 Application(s) Topical daily  cholecalciferol 1000 Unit(s) Oral daily  divalproex  milliGRAM(s) Oral two times a day  enoxaparin Injectable 40 milliGRAM(s) SubCutaneous every 24 hours  pantoprazole    Tablet 40 milliGRAM(s) Oral before breakfast  senna 2 Tablet(s) Oral at bedtime  simvastatin 10 milliGRAM(s) Oral at bedtime    MEDICATIONS  (PRN):  acetaminophen     Tablet .. 650 milliGRAM(s) Oral every 6 hours PRN Temp greater or equal to 38C (100.4F), Mild Pain (1 - 3)  aluminum hydroxide/magnesium hydroxide/simethicone Suspension 30 milliLiter(s) Oral every 4 hours PRN Dyspepsia  melatonin 3 milliGRAM(s) Oral at bedtime PRN Insomnia  ondansetron Injectable 4 milliGRAM(s) IV Push every 8 hours PRN Nausea and/or Vomiting      Objective:    Vitals: Vital Signs Last 24 Hrs  T(C): 36.9 (03-23-24 @ 06:23), Max: 36.9 (03-23-24 @ 06:23)  T(F): 98.4 (03-23-24 @ 06:23), Max: 98.4 (03-23-24 @ 06:23)  HR: 76 (03-23-24 @ 06:23) (67 - 76)  BP: 121/70 (03-23-24 @ 06:23) (108/66 - 121/70)  BP(mean): --  RR: 18 (03-23-24 @ 06:23) (18 - 18)  SpO2: 96% (03-23-24 @ 06:23) (96% - 97%)            I&O's Summary    22 Mar 2024 07:01  -  23 Mar 2024 07:00  --------------------------------------------------------  IN: 0 mL / OUT: 2200 mL / NET: -2200 mL        PHYSICAL EXAM:  GENERAL: NAD  HEAD:  Atraumatic, Normocephalic  EYES: EOMI, conjunctiva and sclera clear  CHEST/LUNG: Clear to auscultation bilaterally; No rales, rhonchi, wheezing, or rubs  HEART: Regular rate and rhythm; No murmurs, rubs, or gallops  ABDOMEN: Soft, Nontender, Nondistended;   SKIN: No rashes or lesions  NERVOUS SYSTEM:  Alert & Oriented X3, no focal deficits    LABS:                              CAPILLARY BLOOD GLUCOSE          RADIOLOGY & ADDITIONAL TESTS:    Imaging Personally Reviewed:  [x ] YES  [ ] NO    Consultants involved in case:   Consultant(s) Notes Reviewed:  [ x] YES  [ ] NO:   Care Discussed with Consultants/Other Providers [x ] YES  [ ] NO         ***************************************************************  Nick  [facility] PGY2  Internal Medicine   ***************************************************************    NEIDA ALCARAZ  63y  MRN: [id number]    Patient is a 63y old  Male who presents with a chief complaint of Weakness (22 Mar 2024 08:07)      Subjective: NAEO. No acute complaints.     MEDICATIONS  (STANDING):  ARIPiprazole 10 milliGRAM(s) Oral daily  chlorhexidine 4% Liquid 1 Application(s) Topical daily  cholecalciferol 1000 Unit(s) Oral daily  divalproex  milliGRAM(s) Oral two times a day  enoxaparin Injectable 40 milliGRAM(s) SubCutaneous every 24 hours  pantoprazole    Tablet 40 milliGRAM(s) Oral before breakfast  senna 2 Tablet(s) Oral at bedtime  simvastatin 10 milliGRAM(s) Oral at bedtime    MEDICATIONS  (PRN):  acetaminophen     Tablet .. 650 milliGRAM(s) Oral every 6 hours PRN Temp greater or equal to 38C (100.4F), Mild Pain (1 - 3)  aluminum hydroxide/magnesium hydroxide/simethicone Suspension 30 milliLiter(s) Oral every 4 hours PRN Dyspepsia  melatonin 3 milliGRAM(s) Oral at bedtime PRN Insomnia  ondansetron Injectable 4 milliGRAM(s) IV Push every 8 hours PRN Nausea and/or Vomiting      Objective:    Vitals: Vital Signs Last 24 Hrs  T(C): 36.9 (03-23-24 @ 06:23), Max: 36.9 (03-23-24 @ 06:23)  T(F): 98.4 (03-23-24 @ 06:23), Max: 98.4 (03-23-24 @ 06:23)  HR: 76 (03-23-24 @ 06:23) (67 - 76)  BP: 121/70 (03-23-24 @ 06:23) (108/66 - 121/70)  BP(mean): --  RR: 18 (03-23-24 @ 06:23) (18 - 18)  SpO2: 96% (03-23-24 @ 06:23) (96% - 97%)            I&O's Summary    22 Mar 2024 07:01  -  23 Mar 2024 07:00  --------------------------------------------------------  IN: 0 mL / OUT: 2200 mL / NET: -2200 mL        PHYSICAL EXAM:  GENERAL: NAD  HEAD:  Atraumatic, Normocephalic  EYES: EOMI, conjunctiva and sclera clear  CHEST/LUNG: Clear to auscultation bilaterally; No rales, rhonchi, wheezing, or rubs  HEART: Regular rate and rhythm; No murmurs, rubs, or gallops  ABDOMEN: Soft, Nontender, Nondistended;   SKIN: No rashes or lesions  NERVOUS SYSTEM:  Alert & Oriented X3.    LABS:                              CAPILLARY BLOOD GLUCOSE          RADIOLOGY & ADDITIONAL TESTS:    Imaging Personally Reviewed:  [x ] YES  [ ] NO    Consultants involved in case:   Consultant(s) Notes Reviewed:  [ x] YES  [ ] NO:   Care Discussed with Consultants/Other Providers [x ] YES  [ ] NO

## 2024-03-23 NOTE — PROGRESS NOTE ADULT - PROBLEM SELECTOR PLAN 4
- DVT ppx: Lovenox SQ   - GI ppx: PPI   - Diet: Regular, thin liquid   - PT consulted, 3/19 re discussed to have pt have increased PT inpatient setting    Dispo : [facility] - DVT ppx: Lovenox SQ   - GI ppx: PPI   - Diet: Regular, thin liquid   - PT consulted, 3/19 re discussed to have pt have increased PT inpatient setting    Dispo : [facility], pending determination by group home

## 2024-03-24 LAB — SARS-COV-2 RNA SPEC QL NAA+PROBE: SIGNIFICANT CHANGE UP

## 2024-03-24 PROCEDURE — 99232 SBSQ HOSP IP/OBS MODERATE 35: CPT | Mod: GC

## 2024-03-24 RX ADMIN — PANTOPRAZOLE SODIUM 40 MILLIGRAM(S): 20 TABLET, DELAYED RELEASE ORAL at 05:27

## 2024-03-24 RX ADMIN — DIVALPROEX SODIUM 500 MILLIGRAM(S): 500 TABLET, DELAYED RELEASE ORAL at 17:46

## 2024-03-24 RX ADMIN — CHLORHEXIDINE GLUCONATE 1 APPLICATION(S): 213 SOLUTION TOPICAL at 09:37

## 2024-03-24 RX ADMIN — Medication 1000 UNIT(S): at 09:36

## 2024-03-24 RX ADMIN — DIVALPROEX SODIUM 500 MILLIGRAM(S): 500 TABLET, DELAYED RELEASE ORAL at 05:25

## 2024-03-24 RX ADMIN — ARIPIPRAZOLE 10 MILLIGRAM(S): 15 TABLET ORAL at 09:37

## 2024-03-24 RX ADMIN — SENNA PLUS 2 TABLET(S): 8.6 TABLET ORAL at 21:36

## 2024-03-24 RX ADMIN — SIMVASTATIN 10 MILLIGRAM(S): 20 TABLET, FILM COATED ORAL at 21:36

## 2024-03-24 RX ADMIN — ENOXAPARIN SODIUM 40 MILLIGRAM(S): 100 INJECTION SUBCUTANEOUS at 05:25

## 2024-03-24 NOTE — PROGRESS NOTE ADULT - PROBLEM SELECTOR PLAN 1
Recent d/cd from [place] rehab. Went back to his group home and noted to be very weak.  Ambulates w/ walker but is very unsteady, had several near fall episodes at the group home requiring full assistance  Patient is high risk for fall given weakness and is unsafe to stay at group home     - Review of labs and imaging unrevealing.  CK wnl, RVP/COVID neg.   - UA/UC: negative  - Likely component of debility in this frail older adult   - Fall precaution    - MRI lumbar/ skeletal survey done  - PT consult : [facility] rec  -per pt concerns will discuss SW/CM regarding home PT or outpt PT; spoke with tee manager of group home indicated that pt needs be able walk 6 stairs to come back to group home  - SW/ CM for safe dispo planning  - B12, Folate, TSH normal  - MRI lumbar imaging completed, plan for spine consultation re: findings - no surgical intervention

## 2024-03-24 NOTE — PROGRESS NOTE ADULT - SUBJECTIVE AND OBJECTIVE BOX
***************************************************************  Romero (Ji-Cheng) [facility] PGY2  Internal Medicine   ***************************************************************    NEIDA ALCARAZ  63y  MRN: [id number]    Patient is a 63y old  Male who presents with a chief complaint of Weakness (23 Mar 2024 07:30)      Subjective: no events ON. Denies fever, CP, SOB, abn pain, N/V, dysuria. Tolerating diet.      MEDICATIONS  (STANDING):  ARIPiprazole 10 milliGRAM(s) Oral daily  chlorhexidine 4% Liquid 1 Application(s) Topical daily  cholecalciferol 1000 Unit(s) Oral daily  divalproex  milliGRAM(s) Oral two times a day  enoxaparin Injectable 40 milliGRAM(s) SubCutaneous every 24 hours  pantoprazole    Tablet 40 milliGRAM(s) Oral before breakfast  senna 2 Tablet(s) Oral at bedtime  simvastatin 10 milliGRAM(s) Oral at bedtime    MEDICATIONS  (PRN):  acetaminophen     Tablet .. 650 milliGRAM(s) Oral every 6 hours PRN Temp greater or equal to 38C (100.4F), Mild Pain (1 - 3)  aluminum hydroxide/magnesium hydroxide/simethicone Suspension 30 milliLiter(s) Oral every 4 hours PRN Dyspepsia  melatonin 3 milliGRAM(s) Oral at bedtime PRN Insomnia  ondansetron Injectable 4 milliGRAM(s) IV Push every 8 hours PRN Nausea and/or Vomiting      Objective:    Vitals: Vital Signs Last 24 Hrs  T(C): 36.7 (03-24-24 @ 06:38), Max: 37.1 (03-23-24 @ 20:47)  T(F): 98.1 (03-24-24 @ 06:38), Max: 98.7 (03-23-24 @ 20:47)  HR: 70 (03-24-24 @ 06:38) (70 - 83)  BP: 109/73 (03-24-24 @ 06:38) (105/64 - 112/73)  BP(mean): --  RR: 17 (03-24-24 @ 06:38) (16 - 18)  SpO2: 98% (03-24-24 @ 06:38) (94% - 98%)            I&O's Summary    23 Mar 2024 07:01  -  24 Mar 2024 07:00  --------------------------------------------------------  IN: 0 mL / OUT: 1900 mL / NET: -1900 mL        PHYSICAL EXAM:  GENERAL: NAD  HEAD:  Atraumatic, Normocephalic  EYES: EOMI, conjunctiva and sclera clear  CHEST/LUNG: Clear to auscultation bilaterally; No rales, rhonchi, wheezing, or rubs  HEART: Regular rate and rhythm; No murmurs, rubs, or gallops  ABDOMEN: Soft, Nontender, Nondistended;   SKIN: No rashes or lesions  NERVOUS SYSTEM:  Alert & Oriented X3, no focal deficits    LABS:                              CAPILLARY BLOOD GLUCOSE          RADIOLOGY & ADDITIONAL TESTS:    Imaging Personally Reviewed:  [x ] YES  [ ] NO    Consultants involved in case:   Consultant(s) Notes Reviewed:  [ x] YES  [ ] NO:   Care Discussed with Consultants/Other Providers [x ] YES  [ ] NO         ***************************************************************  Nickblount (Ji-Cheng) [facility] PGY2  Internal Medicine   ***************************************************************    NEIDA ALCARAZ  63y  MRN: [id number]    Patient is a 63y old  Male who presents with a chief complaint of Weakness (23 Mar 2024 07:30)      Subjective: NAEO. No acute complaints.     MEDICATIONS  (STANDING):  ARIPiprazole 10 milliGRAM(s) Oral daily  chlorhexidine 4% Liquid 1 Application(s) Topical daily  cholecalciferol 1000 Unit(s) Oral daily  divalproex  milliGRAM(s) Oral two times a day  enoxaparin Injectable 40 milliGRAM(s) SubCutaneous every 24 hours  pantoprazole    Tablet 40 milliGRAM(s) Oral before breakfast  senna 2 Tablet(s) Oral at bedtime  simvastatin 10 milliGRAM(s) Oral at bedtime    MEDICATIONS  (PRN):  acetaminophen     Tablet .. 650 milliGRAM(s) Oral every 6 hours PRN Temp greater or equal to 38C (100.4F), Mild Pain (1 - 3)  aluminum hydroxide/magnesium hydroxide/simethicone Suspension 30 milliLiter(s) Oral every 4 hours PRN Dyspepsia  melatonin 3 milliGRAM(s) Oral at bedtime PRN Insomnia  ondansetron Injectable 4 milliGRAM(s) IV Push every 8 hours PRN Nausea and/or Vomiting      Objective:    Vitals: Vital Signs Last 24 Hrs  T(C): 36.7 (03-24-24 @ 06:38), Max: 37.1 (03-23-24 @ 20:47)  T(F): 98.1 (03-24-24 @ 06:38), Max: 98.7 (03-23-24 @ 20:47)  HR: 70 (03-24-24 @ 06:38) (70 - 83)  BP: 109/73 (03-24-24 @ 06:38) (105/64 - 112/73)  BP(mean): --  RR: 17 (03-24-24 @ 06:38) (16 - 18)  SpO2: 98% (03-24-24 @ 06:38) (94% - 98%)            I&O's Summary    23 Mar 2024 07:01  -  24 Mar 2024 07:00  --------------------------------------------------------  IN: 0 mL / OUT: 1900 mL / NET: -1900 mL        PHYSICAL EXAM:  GENERAL: NAD  HEAD:  Atraumatic, Normocephalic  EYES: EOMI, conjunctiva and sclera clear  CHEST/LUNG: Clear to auscultation bilaterally; No rales, rhonchi, wheezing, or rubs  HEART: Regular rate and rhythm; No murmurs, rubs, or gallops  ABDOMEN: Soft, Nontender, Nondistended;   SKIN: No rashes or lesions  NERVOUS SYSTEM:  Alert & Oriented X3, b/l weakness    LABS:                              CAPILLARY BLOOD GLUCOSE          RADIOLOGY & ADDITIONAL TESTS:    Imaging Personally Reviewed:  [x ] YES  [ ] NO    Consultants involved in case:   Consultant(s) Notes Reviewed:  [ x] YES  [ ] NO:   Care Discussed with Consultants/Other Providers [x ] YES  [ ] NO

## 2024-03-24 NOTE — PROGRESS NOTE ADULT - PROBLEM SELECTOR PLAN 4
- DVT ppx: Lovenox SQ   - GI ppx: PPI   - Diet: Regular, thin liquid   - PT consulted, 3/19 re discussed to have pt have increased PT inpatient setting    Dispo : [facility], pending determination by group home

## 2024-03-25 VITALS
SYSTOLIC BLOOD PRESSURE: 112 MMHG | DIASTOLIC BLOOD PRESSURE: 64 MMHG | HEART RATE: 79 BPM | TEMPERATURE: 98 F | OXYGEN SATURATION: 96 % | RESPIRATION RATE: 18 BRPM

## 2024-03-25 LAB
ANION GAP SERPL CALC-SCNC: 14 MMOL/L — SIGNIFICANT CHANGE UP (ref 5–17)
BUN SERPL-MCNC: 22 MG/DL — SIGNIFICANT CHANGE UP (ref 7–23)
CALCIUM SERPL-MCNC: 9.5 MG/DL — SIGNIFICANT CHANGE UP (ref 8.4–10.5)
CHLORIDE SERPL-SCNC: 105 MMOL/L — SIGNIFICANT CHANGE UP (ref 96–108)
CO2 SERPL-SCNC: 21 MMOL/L — LOW (ref 22–31)
CREAT SERPL-MCNC: 0.96 MG/DL — SIGNIFICANT CHANGE UP (ref 0.5–1.3)
EGFR: 88 ML/MIN/1.73M2 — SIGNIFICANT CHANGE UP
GLUCOSE SERPL-MCNC: 82 MG/DL — SIGNIFICANT CHANGE UP (ref 70–99)
HCT VFR BLD CALC: 42.6 % — SIGNIFICANT CHANGE UP (ref 39–50)
HGB BLD-MCNC: 14.5 G/DL — SIGNIFICANT CHANGE UP (ref 13–17)
MAGNESIUM SERPL-MCNC: 2.2 MG/DL — SIGNIFICANT CHANGE UP (ref 1.6–2.6)
MCHC RBC-ENTMCNC: 30.9 PG — SIGNIFICANT CHANGE UP (ref 27–34)
MCHC RBC-ENTMCNC: 34 GM/DL — SIGNIFICANT CHANGE UP (ref 32–36)
MCV RBC AUTO: 90.8 FL — SIGNIFICANT CHANGE UP (ref 80–100)
NRBC # BLD: 0 /100 WBCS — SIGNIFICANT CHANGE UP (ref 0–0)
PHOSPHATE SERPL-MCNC: 3.3 MG/DL — SIGNIFICANT CHANGE UP (ref 2.5–4.5)
PLATELET # BLD AUTO: 225 K/UL — SIGNIFICANT CHANGE UP (ref 150–400)
POTASSIUM SERPL-MCNC: 4.5 MMOL/L — SIGNIFICANT CHANGE UP (ref 3.5–5.3)
POTASSIUM SERPL-SCNC: 4.5 MMOL/L — SIGNIFICANT CHANGE UP (ref 3.5–5.3)
RBC # BLD: 4.69 M/UL — SIGNIFICANT CHANGE UP (ref 4.2–5.8)
RBC # FLD: 12.3 % — SIGNIFICANT CHANGE UP (ref 10.3–14.5)
SODIUM SERPL-SCNC: 140 MMOL/L — SIGNIFICANT CHANGE UP (ref 135–145)
WBC # BLD: 6.84 K/UL — SIGNIFICANT CHANGE UP (ref 3.8–10.5)
WBC # FLD AUTO: 6.84 K/UL — SIGNIFICANT CHANGE UP (ref 3.8–10.5)

## 2024-03-25 PROCEDURE — 80048 BASIC METABOLIC PNL TOTAL CA: CPT

## 2024-03-25 PROCEDURE — 70450 CT HEAD/BRAIN W/O DYE: CPT | Mod: MC

## 2024-03-25 PROCEDURE — 81001 URINALYSIS AUTO W/SCOPE: CPT

## 2024-03-25 PROCEDURE — 71045 X-RAY EXAM CHEST 1 VIEW: CPT

## 2024-03-25 PROCEDURE — 36415 COLL VENOUS BLD VENIPUNCTURE: CPT

## 2024-03-25 PROCEDURE — 85610 PROTHROMBIN TIME: CPT

## 2024-03-25 PROCEDURE — 82550 ASSAY OF CK (CPK): CPT

## 2024-03-25 PROCEDURE — 84443 ASSAY THYROID STIM HORMONE: CPT

## 2024-03-25 PROCEDURE — 72141 MRI NECK SPINE W/O DYE: CPT | Mod: MC

## 2024-03-25 PROCEDURE — 87641 MR-STAPH DNA AMP PROBE: CPT

## 2024-03-25 PROCEDURE — 85027 COMPLETE CBC AUTOMATED: CPT

## 2024-03-25 PROCEDURE — A9585: CPT

## 2024-03-25 PROCEDURE — 97162 PT EVAL MOD COMPLEX 30 MIN: CPT

## 2024-03-25 PROCEDURE — 87635 SARS-COV-2 COVID-19 AMP PRB: CPT

## 2024-03-25 PROCEDURE — 72131 CT LUMBAR SPINE W/O DYE: CPT | Mod: MC

## 2024-03-25 PROCEDURE — 87640 STAPH A DNA AMP PROBE: CPT

## 2024-03-25 PROCEDURE — 72158 MRI LUMBAR SPINE W/O & W/DYE: CPT | Mod: MC

## 2024-03-25 PROCEDURE — 84100 ASSAY OF PHOSPHORUS: CPT

## 2024-03-25 PROCEDURE — 72125 CT NECK SPINE W/O DYE: CPT | Mod: MC

## 2024-03-25 PROCEDURE — 82607 VITAMIN B-12: CPT

## 2024-03-25 PROCEDURE — 85025 COMPLETE CBC W/AUTO DIFF WBC: CPT

## 2024-03-25 PROCEDURE — 97110 THERAPEUTIC EXERCISES: CPT

## 2024-03-25 PROCEDURE — 80164 ASSAY DIPROPYLACETIC ACD TOT: CPT

## 2024-03-25 PROCEDURE — 87086 URINE CULTURE/COLONY COUNT: CPT

## 2024-03-25 PROCEDURE — 99285 EMERGENCY DEPT VISIT HI MDM: CPT | Mod: 25

## 2024-03-25 PROCEDURE — 72146 MRI CHEST SPINE W/O DYE: CPT | Mod: MC

## 2024-03-25 PROCEDURE — 80053 COMPREHEN METABOLIC PANEL: CPT

## 2024-03-25 PROCEDURE — 93005 ELECTROCARDIOGRAM TRACING: CPT

## 2024-03-25 PROCEDURE — 83735 ASSAY OF MAGNESIUM: CPT

## 2024-03-25 PROCEDURE — 77074 RADEX OSSEOUS SURVEY LMTD: CPT

## 2024-03-25 PROCEDURE — 82746 ASSAY OF FOLIC ACID SERUM: CPT

## 2024-03-25 PROCEDURE — 99239 HOSP IP/OBS DSCHRG MGMT >30: CPT

## 2024-03-25 PROCEDURE — 87637 SARSCOV2&INF A&B&RSV AMP PRB: CPT

## 2024-03-25 RX ADMIN — ARIPIPRAZOLE 10 MILLIGRAM(S): 15 TABLET ORAL at 14:01

## 2024-03-25 RX ADMIN — ENOXAPARIN SODIUM 40 MILLIGRAM(S): 100 INJECTION SUBCUTANEOUS at 06:22

## 2024-03-25 RX ADMIN — CHLORHEXIDINE GLUCONATE 1 APPLICATION(S): 213 SOLUTION TOPICAL at 14:01

## 2024-03-25 RX ADMIN — PANTOPRAZOLE SODIUM 40 MILLIGRAM(S): 20 TABLET, DELAYED RELEASE ORAL at 06:22

## 2024-03-25 RX ADMIN — Medication 1000 UNIT(S): at 14:01

## 2024-03-25 RX ADMIN — DIVALPROEX SODIUM 500 MILLIGRAM(S): 500 TABLET, DELAYED RELEASE ORAL at 06:22

## 2024-03-25 NOTE — PROGRESS NOTE ADULT - PROBLEM SELECTOR PROBLEM 3
Bipolar disorder

## 2024-03-25 NOTE — PROGRESS NOTE ADULT - PROBLEM SELECTOR PLAN 1
Recent d/cd from [place] re[facility]. Went back to his group home and noted to be very weak.  Ambulates w/ walker but is very unsteady, had several near fall episodes at the group home requiring full assistance  Patient is high risk for fall given weakness and is unsafe to stay at group home     - Review of labs and imaging unrevealing.  CK wnl, RVP/COVID neg.   - UA/UC: negative  - Likely component of debility in this frail older adult   - Fall precaution    - MRI lumbar/ skeletal survey done  - PT consult : [facility] rec  -per pt concerns will discuss SW/CM regarding home PT or outpt PT; spoke with tee manager of group home indicated that pt needs be able walk 6 stairs to come back to group home  - SW/ CM for safe dispo planning  - B12, Folate, TSH normal  - MRI lumbar imaging completed, plan for spine consultation re: findings - no surgical intervention

## 2024-03-25 NOTE — PROGRESS NOTE ADULT - ATTENDING COMMENTS
Intellectual disability with limited mobility- D/C to [facility] today  45 minutes spent discharging the patient

## 2024-03-25 NOTE — PROGRESS NOTE ADULT - PROBLEM SELECTOR PROBLEM 1
Functional weakness

## 2024-03-25 NOTE — PROGRESS NOTE ADULT - PROBLEM SELECTOR PLAN 3
- Aripiprazole

## 2024-03-25 NOTE — PROGRESS NOTE ADULT - PROBLEM SELECTOR PLAN 2
- Divalproex  - Seizure precaution

## 2024-03-25 NOTE — PROGRESS NOTE ADULT - SUBJECTIVE AND OBJECTIVE BOX
Patient is a 63y old  Male who presents with a chief complaint of Weakness (24 Mar 2024 07:15)      SUBJECTIVE / OVERNIGHT EVENTS:  No acute events overnight. Patient seen and examined at bedside.    MEDICATIONS  (STANDING):  ARIPiprazole 10 milliGRAM(s) Oral daily  chlorhexidine 4% Liquid 1 Application(s) Topical daily  cholecalciferol 1000 Unit(s) Oral daily  divalproex  milliGRAM(s) Oral two times a day  enoxaparin Injectable 40 milliGRAM(s) SubCutaneous every 24 hours  pantoprazole    Tablet 40 milliGRAM(s) Oral before breakfast  senna 2 Tablet(s) Oral at bedtime  simvastatin 10 milliGRAM(s) Oral at bedtime    MEDICATIONS  (PRN):  acetaminophen     Tablet .. 650 milliGRAM(s) Oral every 6 hours PRN Temp greater or equal to 38C (100.4F), Mild Pain (1 - 3)  aluminum hydroxide/magnesium hydroxide/simethicone Suspension 30 milliLiter(s) Oral every 4 hours PRN Dyspepsia  melatonin 3 milliGRAM(s) Oral at bedtime PRN Insomnia  ondansetron Injectable 4 milliGRAM(s) IV Push every 8 hours PRN Nausea and/or Vomiting      CAPILLARY BLOOD GLUCOSE        I&O's Summary    24 Mar 2024 07:01  -  25 Mar 2024 07:00  --------------------------------------------------------  IN: 240 mL / OUT: 1000 mL / NET: -760 mL        PHYSICAL EXAM:  Vital Signs Last 24 Hrs  T(C): 36.8 (25 Mar 2024 04:45), Max: 36.8 (24 Mar 2024 14:19)  T(F): 98.2 (25 Mar 2024 04:45), Max: 98.2 (24 Mar 2024 14:19)  HR: 69 (25 Mar 2024 04:45) (69 - 80)  BP: 94/65 (25 Mar 2024 04:45) (94/65 - 117/67)  BP(mean): --  RR: 18 (25 Mar 2024 04:45) (18 - 18)  SpO2: 94% (25 Mar 2024 04:45) (94% - 98%)    Parameters below as of 25 Mar 2024 04:45  Patient On (Oxygen Delivery Method): room air        GENERAL: No acute distress, well-developed  HEAD:  Atraumatic, Normocephalic  EYES: EOMI, PERRLA, conjunctiva and sclera clear  NECK: Supple, no lymphadenopathy, no JVD  CHEST/LUNG: CTAB; No wheezes, rales, or rhonchi  HEART: Regular rate and rhythm; No murmurs, rubs, or gallops  ABDOMEN: Soft, non-tender, non-distended; normal bowel sounds, no organomegaly  EXTREMITIES:  2+ peripheral pulses b/l, No clubbing, cyanosis, or edema  NEUROLOGY: A&O x 3, no focal deficits. b/l weakness  SKIN: No rashes or lesions    LABS:                        14.5   6.84  )-----------( 225      ( 25 Mar 2024 07:27 )             42.6                       RADIOLOGY & ADDITIONAL TESTS:  Results Reviewed:   Imaging Personally Reviewed:  Electrocardiogram Personally Reviewed:    COORDINATION OF CARE:  Care Discussed with Consultants/Other Providers [Y/N]:  Prior or Outpatient Records Reviewed [Y/N]:

## 2024-05-21 NOTE — H&P ADULT - NSVTERISKREFERASSESS_GEN_ALL_CORE
Refer to the Assessment tab to view/cancel completed assessment. There are no Wet Read(s) to document.

## 2025-01-11 NOTE — DIETITIAN INITIAL EVALUATION ADULT - REASON
Patient
Patient eating well PTA w/ no weight fluctuations reported, eating well during admission, will monitor parameters

## 2025-04-08 NOTE — ED ADULT NURSE NOTE - ED CARDIAC RATE
ED Attending Physician Note      Patient : Leslee Ames Age: 87 year old Sex: female   MRN: [id number] Encounter Date: 4/8/2025    History of Present Illness   Time Seen: 8:01 AM    Chief Complaint   Patient presents with    Fall    Head Injury With Unknown LOC       History: Leslee Ames is a 87 year old female with pmh of dementia hypertension hyperlipidemia PE on Coumadin who presents to the emergency department for evaluation of fall.  Mandarin speaking, video  used at bedside.  Collateral obtained from prehospital EMS/SNF report.  Patient reportedly had an unwitnessed fall this morning out of a chair, has a noted laceration to her forehead.  Patient says that she was getting changes morning when she lost her balance and fell.  Denies LOC.  No neck pain.  Is having pain to her right forehead and left hip.    PMD: Jorge Jain MD  EM Caveat:     Physical Exam     Vitals:    04/08/25 0803 04/08/25 0840 04/08/25 0910 04/08/25 0940   BP: (!) 150/77 (!) 170/83 (!) 151/83 (!) 163/77   Pulse: 86 63 72 61   Resp: 20 14 (!) 25 13   Temp: 98.2 °F (36.8 °C)      TempSrc: Oral      SpO2: 97% 97% 97% 96%    04/08/25 1010 04/08/25 1217   BP: (!) 160/88 131/86   Pulse: (!) 60 66   Resp: (!) 30 20   Temp:     TempSrc:     SpO2: 97% 97%      Physical Exam  General: Well-appearing in no acute distress.  Head: Stellate right frontal scalp laceration, U-shaped, 4-5cm. Separate 1cm laceration to the lower aspect of the scalp. Overlying denuded skin with avulsion tear with venous oozing noted. No scalp hematoma. Normocephalic.  Eyes and ENT: PERRL bilaterally. EOM's intact. Moist mucous membranes, oropharynx without erythema or exudate. No Haines sign or Raccoon eyes. No midface ttp or jaw malocclusion. Grossly normal visual fields without nystagmus. No anisocoria.  Cardiovascular: Regular rate and rhythm. Brisk capillary refill. No lower extremity pitting edema.  Respiratory: Speaking full sentences without accessory muscle use.  Clear, vesicular breath sounds bilaterally with no focal findings.  Abdominal: Soft, non-distended. No reproducible abdominal tenderness. No rigidity, guarding, or rebound. No palpable masses. No peritoneal signs.  Musculoskeletal: No midline spinal ttp or bony stepoffs. Mild left hip ttp no issue with logroll. No distal femur, knee tib fib or ankle ttp, ranging all joints fully without issue.  Skin:  Warm, dry.  Neurologic: Alert and oriented x1-2 (reportedly baseline). CN II-XII intact. Normal sensation and motor function in bilateral upper and lower extremities. Wheelchair-bound at baseline.  Psychiatric: Normal behavior and affect.    Medical Decision Making     Elderly female with history as above presents for evaluation of closed head injury after fall.  Hemodynamically stable nontoxic-appearing with exam as above.  No focal deficit on neurologic exam reportedly at her baseline.  Plan for workup to exclude precipitant such as ACS dysrhythmia electrolyte or metabolic derangement as well as secondary traumatic pathology such as ICH bony fracture.  Plan for labs, imaging including CT head and spine, x-ray of the hip, update tetanus, symptomatic treatment disposition.    Workup reviewed.  No leukocytosis or bandemia.  Normal electrolytes.  INR 1.9.  Otherwise no acute findings.  Imaging personally visualized per CT no bleed bony fracture or acute findings.  X-ray hip no bony fracture dislocation or other acute findings.    Reevaluated.  See separate PA note for laceration repair.  Topical gauze placed thereafter with some venous oozing noted.  Discussed closely with son who is at bedside later, Sae Kirby.  He is wondering regarding her knee pain which reportedly she has had for some time so added on x-ray of the knee which I personally visualized, appears to have an irregularity of the distal femur with overlying callus formation and this appears improved compared to radiographic imaging from July 2021 which  appears to be when she acutely fractured it.  She has no reproducible tenderness to the femur and is actually ranging fully without issue.  Son says that she has been wheelchair-bound for a number of years and does not weight-bear or ambulate, he is not worried about a new acute process or fracture which I tend agree with as well.    This point, son is worried about delayed bleeding due to patient being anticoagulated.  He did they will not be able to monitor this closely at the nursing facility because there was a bad experience where they dropped her in the past.  As such, will observe overnight in the hospital for delayed intracranial hemorrhage/scalp bleeding, discussed closely with hospitalist, Dr. Barrios, who recommends observation admission. Care transitioned.    ED Course     ED Course as of 04/08/25 1348   Tue Apr 08, 2025   0947 EKG normal sinus rhythm at 62 narrow QRS normal axis no ST elevation QTc 452 otherwise normal intervals.  Q waves in the anterior leads.  No ARVD LQTS Brugada HOCM or WPW.  Unchanged compared EKG from 1/10/2023. [TA]      ED Course User Index  [TA] Dixie Krause MD       ED Events     ED Medication Orders (From admission, onward)      Ordered Start     Status Ordering Provider    04/08/25 0801 04/08/25 0815  diphtheria-pertussis (acellular)-tetanus (BOOSTRIX) vaccine 0.5 mL  ONCE         Last MAR action: Given DIXIE KRAUSE            Social determinant of health affecting care: as above    Patient's plan of care - including HPI, physical examination findings, workup, diagnosis, differential diagnosis, and treatment plan - was discussed with the patient and any family members who were available for discussion. If the patient is being discharged home, it was recommended they closely follow up with their primary care physician in the next 24 to 48 hours and return immediately to the ER if there are any new, worsening, or concerning symptoms. If the patient is being admitted or  transferred, this was also discussed. All further questions were answered at this time. Patient and any available family members verbalized understanding and agreement with the plan.    In addition to history obtained from the patient, I have obtained additional information from available independent historians such as family members and/or visitors.  In addition, I have reviewed prior external records from each unique source, EMS records and any documents or data that accompanied the patient to the emergency department today.  In consideration of this patient’s care, I have considered the need for laboratory or imaging exams, the possibility of hospitalization as well any prescription medications such as antibiotics and pain medication.     I have discussed the patient’s care with other providers including but not limited to PharmD, PA, ERT, RN and MDs when appropriate.     Procedures   Procedures    Diagnosis and Disposition   Clinical Impression:   ED Diagnosis   1. Fall, initial encounter        2. Closed head injury, initial encounter        3. Laceration of scalp, initial encounter        4. Pain of left hip            Current Discharge Medication List        Prior to Admission Medications    Details   morphine, IMM REL, (MSIR) 15 MG tablet Take 0.5 tablets by mouth every 4 hours as needed for Pain.  Qty: 15 tablet, Refills: 0      acetaminophen (TYLENOL) 325 MG tablet Take 2 tablets by mouth every 6 hours as needed for Pain.      warfarin (COUMADIN) 1 MG tablet (warfarin) Take 2 mg nightly on Tuesday and Thursday. Take 3 mg nightly on all other days.  Qty: 90 tablet, Refills: 2      metFORMIN (GLUCOPHAGE) 500 MG tablet Take 500 mg by mouth daily (with breakfast).      guaiFENesin-codeine (guaiFENesin AC) 100-10 MG/5ML liquid Take 2.5 mLs by mouth every 6 hours as needed for Cough.      senna (SENOKOT) 8.6 MG tablet Take 2 tablets by mouth daily.      lidocaine (Bengay Lidocaine) 4 % cream Apply 1 application  topically every morning. Apply to left knee and trapezius      aspirin 81 MG EC tablet Take 81 mg by mouth daily.      gabapentin (NEURONTIN) 400 MG capsule Take 400 mg by mouth at bedtime.       ipratropium-albuterol (DUONEB) 0.5-2.5 (3) MG/3ML nebulizer solution Take 3 mLs by nebulization every 4 hours as needed for Shortness of Breath or Wheezing.      memantine (NAMENDA) 10 MG tablet Take 10 mg by mouth 2 times daily.       metoPROLOL succinate (TOPROL-XL) 25 MG 24 hr tablet Take 25 mg by mouth daily. HOLD IF SYSTEMIC BP IS LESS THAN 100, PULSE LESS THAN 60. TAKE WITH 50 MG FOR TOTAL DOSE OF 75 MG.      metoPROLOL succinate (TOPROL-XL) 50 MG 24 hr tablet Take 50 mg by mouth daily. HOLD IF SYSTEMIC BP IS LESS THAN 100, PULSE LESS THAN 60. TAKE WITH 25MG FOR TOTAL DOSE OF 75 MG      polyethylene glycol (MIRALAX) 17 GM/SCOOP powder Take 17 g by mouth daily as needed.       budesonide-formoterol (SYMBICORT) 160-4.5 MCG/ACT inhaler Inhale 2 puffs into the lungs 2 times daily.      albuterol 108 (90 Base) MCG/ACT inhaler Inhale 2 puffs into the lungs every 4 hours as needed for Shortness of Breath or Wheezing.      bisacodyl (DULCOLAX) 10 MG suppository Place 10 mg rectally daily as needed for Constipation.           Current Discharge Medication List        Admit 4/8/2025  1:34 PM  Telemetry Bed?: No  Admitting Physician: NOEMI MERCADO [[id number]]  Is this a telephone or verbal order?: This is a telephone order from the admitting physician    Additional Documentation, Lab & Radiology Results     Past Medical History:   Diagnosis Date    A-fib  (CMD)     Anemia     Arthritis     Asthma (CMD)     CAD (coronary artery disease)     Cerebral artery occlusion     Cerebral infarction, unspecified (CMD)     Congestive cardiac failure  (CMD)     Constipation     Contracture of joint of right shoulder region     COPD (chronic obstructive pulmonary disease)  (CMD)     Diabetes  (CMD)     Essential (primary) hypertension      Gastroesophageal reflux disease     Hemiparesis affecting right side as late effect of cerebrovascular accident  (CMD)     High cholesterol     Osteoporosis     Syncope and collapse     Uncomplicated senile dementia  (CMD)        Past Surgical History:   Procedure Laterality Date    ARTHRODESIS      spinal    BACK SURGERY         Family History   Problem Relation Age of Onset    Patient is unaware of any medical problems Mother     Patient is unaware of any medical problems Father        Social History     Tobacco Use    Smoking status: Never    Smokeless tobacco: Never   Vaping Use    Vaping status: never used   Substance Use Topics    Alcohol use: No    Drug use: No       Allergies   Allergen Reactions    Shellfish Allergy   (Food Or Med) Other (See Comments)     pt does not eat fish       Results for orders placed or performed during the hospital encounter of 04/08/25   Comprehensive Metabolic Panel    Specimen: Blood, Venous   Result Value Ref Range    Fasting Status      Sodium 140 135 - 145 mmol/L    Potassium 4.4 3.4 - 5.1 mmol/L    Chloride 111 (H) 97 - 110 mmol/L    Carbon Dioxide 26 21 - 32 mmol/L    Anion Gap 7 7 - 19 mmol/L    Glucose 85 70 - 99 mg/dL    BUN 19 6 - 20 mg/dL    Creatinine 0.68 0.51 - 0.95 mg/dL    Glomerular Filtration Rate 83 >=60    BUN/Cr 28 (H) 7 - 25    Calcium 8.8 8.4 - 10.2 mg/dL    Bilirubin, Total 0.3 0.2 - 1.0 mg/dL    GOT/AST 21 <=37 Units/L    GPT/ALT 15 <64 Units/L    Alkaline Phosphatase 104 45 - 117 Units/L    Albumin 3.0 (L) 3.4 - 5.0 g/dL    Protein, Total 7.3 6.4 - 8.2 g/dL    Globulin 4.3 (H) 2.0 - 4.0 g/dL    A/G Ratio 0.7 (L) 1.0 - 2.4   Prothrombin Time (INR/PT)    Specimen: Blood, Venous   Result Value Ref Range    Protime- PT 19.1 (H) 9.7 - 11.8 sec    INR 1.9     CBC with Automated Differential (performable only)    Specimen: Blood, Venous   Result Value Ref Range    WBC 7.9 4.2 - 11.0 K/mcL    RBC 4.01 4.00 - 5.20 mil/mcL    HGB 11.5 (L) 12.0 - 15.5 g/dL    HCT  38.8 36.0 - 46.5 %    MCV 96.8 78.0 - 100.0 fl    MCH 28.7 26.0 - 34.0 pg    MCHC 29.6 (L) 32.0 - 36.5 g/dL    RDW-CV 14.3 11.0 - 15.0 %    RDW-SD 51.3 (H) 39.0 - 50.0 fL     140 - 450 K/mcL    NRBC 0 <=0 /100 WBC    Neutrophil, Percent 62 %    Lymphocytes, Percent 25 %    Mono, Percent 7 %    Eosinophils, Percent 5 %    Basophils, Percent 1 %    Immature Granulocytes 0 %    Absolute Neutrophils 4.9 1.8 - 7.7 K/mcL    Absolute Lymphocytes 2.0 1.0 - 4.0 K/mcL    Absolute Monocytes 0.6 0.3 - 0.9 K/mcL    Absolute Eosinophils  0.4 0.0 - 0.5 K/mcL    Absolute Basophils 0.0 0.0 - 0.3 K/mcL    Absolute Immature Granulocytes 0.0 0.0 - 0.2 K/mcL   TROPONIN I, HIGH SENSITIVITY    Specimen: Blood, Venous   Result Value Ref Range    Troponin I, High Sensitivity 17 <52 ng/L   Partial Thromboplastin Time (PTT)    Specimen: Blood, Venous   Result Value Ref Range    PTT 38 (H) 22 - 32 sec       Imaging Results              XR KNEE 1 OR 2 VIEWS LEFT (Final result)  Result time 04/08/25 12:38:38   Procedure changed from XR KNEE 3 VIEW LEFT     Final result                   Impression:    1. Moderate left knee OA with chondrocalcinosis of the menisci.  2. Irregularity of the distal femur suggestive of mildly  angulated/displaced diaphyseal fracture with at least subacute components.  Superimposed acute component is difficult to exclude. Consider dedicated  femur radiograph..  3. No acute malalignment.  4. Examination otherwise limited due to limited mobility.          Electronically Signed by: RONEY CAI MD   Signed on: 4/8/2025 12:38 PM   Workstation ID: [id number]               Narrative:    EXAM: XR KNEE 1 OR 2 VIEWS LEFT    CLINICAL INDICATION: Pain. Injury.    COMPARISON: None.    FINDINGS:    Left knee: Arthritic changes characterized by joint space narrowing and  marginal osteophytes with chondrocalcinosis of the menisci. At the level of  the distal femoral shaft there is irregularity with cortical  thickening  suggestive of fracture with possible at least some subacute components.  Suboptimal lateral view, precluding assessment for joint effusion.                                       XR HIP 2 VIEWS LEFT AND PELVIS (Final result)  Result time 04/08/25 09:29:56      Final result                   Impression:    1.  Bony demineralization without visible fracture of the pelvis or left  hip.  Cross-sectional imaging could be performed for further assessment if  clinically indicated.  2.  Mild hip osteoarthritis with mild chondrocalcinosis.    Electronically Signed by: ILEANA DANIELSON M.D.   Signed on: 4/8/2025 9:29 AM   Workstation ID: [id number]               Narrative:    EXAM: XR HIP 2 VIEWS LEFT AND PELVIS    INDICATION: Pain  FELL. fall    COMPARISON: 7/22/2021    TECHNIQUE: Supine AP radiograph of the pelvis with supine AP and lateral  radiographs of the left hip    FINDINGS: The bones are demineralized, degrading assessment.  Additionally,  external rotation of bilateral lower extremities foreshortens the femoral  necks and degrades assessment of the right proximal femur.  No left hip  fracture or dislocation is seen within challenge of bony demineralization.   There is mild bilateral hip osteoarthritis.  Postsurgical changes of lumbar  posterior instrumented and interbody fusion with posterior decompression  are seen.  There may be transitional L5-S1 anatomy.  Degenerative changes  are seen at the sacroiliac joints and pubic symphysis.  There is mild  chondrocalcinosis at the left hip.                                       CT HEAD WO CONTRAST (Final result)  Result time 04/08/25 08:56:55      Final result                   Impression:      1. No evidence of acute intracranial hemorrhage, mass effect, or midline  shift.    2. Chronic microvascular ischemic changes.    3. Right frontal scalp swelling.                          Electronically Signed by: ELIJAH RIVERA M.D.   Signed on: 4/8/2025 8:56 AM    Workstation ID: [id number]               Narrative:    NON CONTRAST CT BRAIN DATED 4/8/2025    CLINICAL INDICATION: Fall. Trauma. Pain.    COMPARISON: 1/10/2023    TECHNIQUE:   Multiple axial noncontrast images of the brain were obtained from foramen  magnum to vertex.  Automated exposure control (AEC) technique was utilized  as radiation dose reduction.    FINDINGS:    There is generalized parenchymal volume loss consistent with patient age.   There is no mass effect or midline shift.  There is no evidence of acute  intracranial hemorrhage.  There is no evidence of a mass on this  noncontrast study.      Patchy areas of low density in the subcortical and periventricular white  matter likely represent chronic microvascular ischemic changes.  There is  no evidence of an acute cortical infarct, although CT has limited  sensitivity for the detection of acute infarcts.    There are no extra-axial fluid collections.  Intracranial arterial  calcifications are present.  The osseous structures appear within normal  limits.  The visualized paranasal sinuses and air cells are clear. There is  right frontal scalp swelling.                                       CT CERVICAL SPINE WO CONTRAST (Final result)  Result time 04/08/25 09:00:18      Final result                   Impression:      1. No evidence of acute fracture or traumatic subluxation of the cervical  spine.    2. Multilevel degenerative changes.    3. No significant interval change.    Electronically Signed by: ELIJAH RIVERA M.D.   Signed on: 4/8/2025 9:00 AM   Workstation ID: [id number]               Narrative:    CT OF THE CERVICAL SPINE WITHOUT CONTRAST 4/8/2025    CLINICAL INDICATION: Fall. Trauma. Pain.    COMPARISON: 7/22/2021    TECHNIQUE: Helical CT of the cervical spine was performed at 2.5 mm slice  thickness from the craniocervical junction through the superior endplate of  T2. The scans were reconstructed at 1.25 mm slice thickness. Sagittal  and  coronal reformats were created.  Iterative reconstruction technique was  utilized as radiation dose reduction strategy in this patient.    FINDINGS:    There is no evidence of acute fracture or traumatic subluxation of the  cervical spine.  The bony craniocervical junction is intact.  The facet  joints are well aligned, without abnormal widening.  The dens is intact.   There is no prevertebral soft tissue swelling.    There are degenerative changes at the atlantodental articulation. There are  degenerative changes at multiple levels in the cervical spine with disc  height loss and spurring. Sclerotic lesion involving C7 spinous process is  stable since 2021. There is limited evaluation of the soft tissue  structures within the spinal canal secondary to bone streak artifact.    There is atherosclerotic plaque at the carotid bifurcations and proximal  ICAs.                                      _________________  Dixie Krause MD  [facility]  [facility]  4/8/2025 1:48 PM     Dixie Krause MD  04/08/25 5859     normal
